# Patient Record
Sex: MALE | Race: BLACK OR AFRICAN AMERICAN
[De-identification: names, ages, dates, MRNs, and addresses within clinical notes are randomized per-mention and may not be internally consistent; named-entity substitution may affect disease eponyms.]

---

## 2019-06-21 ENCOUNTER — HOSPITAL ENCOUNTER (EMERGENCY)
Dept: HOSPITAL 5 - ED | Age: 74
Discharge: HOME | End: 2019-06-21
Payer: MEDICARE

## 2019-06-21 VITALS — SYSTOLIC BLOOD PRESSURE: 107 MMHG | DIASTOLIC BLOOD PRESSURE: 65 MMHG

## 2019-06-21 DIAGNOSIS — E11.9: ICD-10-CM

## 2019-06-21 DIAGNOSIS — I10: Primary | ICD-10-CM

## 2019-06-21 DIAGNOSIS — E78.00: ICD-10-CM

## 2019-06-21 DIAGNOSIS — Z79.899: ICD-10-CM

## 2019-06-21 DIAGNOSIS — G47.00: ICD-10-CM

## 2019-06-21 DIAGNOSIS — M19.90: ICD-10-CM

## 2019-06-21 LAB
ALBUMIN SERPL-MCNC: 3.8 G/DL (ref 3.9–5)
ALT SERPL-CCNC: 16 UNITS/L (ref 7–56)
BILIRUB UR QL STRIP: (no result)
BLOOD UR QL VISUAL: (no result)
BUN SERPL-MCNC: 13 MG/DL (ref 9–20)
BUN/CREAT SERPL: 22 %
CALCIUM SERPL-MCNC: 8.7 MG/DL (ref 8.4–10.2)
HCT VFR BLD CALC: 38 % (ref 35.5–45.6)
HEMOLYSIS INDEX: 8
HGB BLD-MCNC: 13.4 GM/DL (ref 11.8–15.2)
MCHC RBC AUTO-ENTMCNC: 35 % (ref 32–34)
MCV RBC AUTO: 95 FL (ref 84–94)
PH UR STRIP: 8 [PH] (ref 5–7)
PLATELET # BLD: 146 K/MM3 (ref 140–440)
PROT UR STRIP-MCNC: (no result) MG/DL
RBC # BLD AUTO: 4.02 M/MM3 (ref 3.65–5.03)
RBC #/AREA URNS HPF: < 1 /HPF (ref 0–6)
UROBILINOGEN UR-MCNC: < 2 MG/DL (ref ?–2)
WBC #/AREA URNS HPF: < 1 /HPF (ref 0–6)

## 2019-06-21 PROCEDURE — 82962 GLUCOSE BLOOD TEST: CPT

## 2019-06-21 PROCEDURE — 81001 URINALYSIS AUTO W/SCOPE: CPT

## 2019-06-21 PROCEDURE — 80053 COMPREHEN METABOLIC PANEL: CPT

## 2019-06-21 PROCEDURE — 85027 COMPLETE CBC AUTOMATED: CPT

## 2019-06-21 PROCEDURE — 36415 COLL VENOUS BLD VENIPUNCTURE: CPT

## 2019-06-21 NOTE — EMERGENCY DEPARTMENT REPORT
ED General Adult HPI





- General


Chief complaint: High BP


Stated complaint: HYPERTENSION


Time Seen by Provider: 06/21/19 10:05


Source: patient, EMS


Mode of arrival: Stretcher


Limitations: Language Barrier





- History of Present Illness


Initial comments: 





73-year-old male presents to ED with complaint of hypertension and insomnia.  

Patient states he ran out of his sleeping pills, and has been unable to sleep 

for the last 2 nights.  Patient states this has made him very tired.  Patient 

reports he took his blood pressure this morning and it was 160s/90s.  The 

patient denies headache, chest pain, shortness of breath, abdominal pain, 

vomiting, diarrhea or, fever.


-: days(s) (2)


Severity scale (0 -10): 0


Consistency: constant


Improves with: medication


Worsens with: none


Associated Symptoms: denies: chest pain, cough, diaphoresis, fever/chills, 

headaches, nausea/vomiting, shortness of breath





- Related Data


                                Home Medications











 Medication  Instructions  Recorded  Confirmed  Last Taken


 


Gabapentin [Neurontin] 300 mg PO DAILY 06/06/14 02/12/19 Unknown


 


ALPRAZolam [Xanax TAB] 0.5 mg PO BID PRN 12/21/18 02/12/19 Unknown


 


Cyclobenzaprine [Flexeril 10 MG 10 mg PO HS 12/21/18 02/12/19 Unknown





TAB]    


 


Metoprolol [Lopressor TAB] 25 mg PO DAILY 12/21/18 02/12/19 Unknown


 


Prazosin [Minipress] 1 mg PO HS 12/21/18 02/12/19 Unknown


 


Ranitidine HCl [Acid Reducer] 150 mg PO BID 12/21/18 02/12/19 Unknown








                                  Previous Rx's











 Medication  Instructions  Recorded  Last Taken  Type


 


metFORMIN XR [Glucophage XR] 500 mg PO QDDIAB #60 tablet 12/22/18 Unknown Rx











                                    Allergies











Allergy/AdvReac Type Severity Reaction Status Date / Time


 


No Known Allergies Allergy   Verified 06/21/19 09:19














ED Review of Systems


ROS: 


Stated complaint: HYPERTENSION


Other details as noted in HPI





Comment: All other systems reviewed and negative


Constitutional: denies: chills, fever


Respiratory: denies: cough, shortness of breath


Cardiovascular: denies: chest pain


Gastrointestinal: denies: abdominal pain, nausea, vomiting, diarrhea


Neurological: weakness (generalized).  denies: headache





ED Past Medical Hx





- Past Medical History


Previous Medical History?: Yes


Hx Hypertension: Yes


Hx Heart Attack/AMI: No


Hx Congestive Heart Failure: No


Hx Diabetes: Yes


Hx Deep Vein Thrombosis: No


Hx Liver Disease: No


Hx Arthritis: Yes


Additional medical history: high cholesterol, sinusitis,





- Surgical History


Past Surgical History?: Yes


Hx Coronary Stent: No


Hx Pacemaker: No


Hx Internal Defibrillator: No





- Social History


Smoking Status: Never Smoker





- Medications


Home Medications: 


                                Home Medications











 Medication  Instructions  Recorded  Confirmed  Last Taken  Type


 


Gabapentin [Neurontin] 300 mg PO DAILY 06/06/14 02/12/19 Unknown History


 


ALPRAZolam [Xanax TAB] 0.5 mg PO BID PRN 12/21/18 02/12/19 Unknown History


 


Cyclobenzaprine [Flexeril 10 MG 10 mg PO HS 12/21/18 02/12/19 Unknown History





TAB]     


 


Metoprolol [Lopressor TAB] 25 mg PO DAILY 12/21/18 02/12/19 Unknown History


 


Prazosin [Minipress] 1 mg PO HS 12/21/18 02/12/19 Unknown History


 


Ranitidine HCl [Acid Reducer] 150 mg PO BID 12/21/18 02/12/19 Unknown History


 


metFORMIN XR [Glucophage XR] 500 mg PO QDDIAB #60 tablet 12/22/18 02/12/19 

Unknown Rx














ED Physical Exam





- General


Limitations: Language Barrier


General appearance: alert, in no apparent distress





- Head


Head exam: Present: atraumatic, normocephalic





- Eye


Eye exam: Present: normal appearance





- ENT


ENT exam: Present: mucous membranes moist





- Neck


Neck exam: Present: normal inspection





- Respiratory


Respiratory exam: Present: normal lung sounds bilaterally.  Absent: respiratory 

distress





- Cardiovascular


Cardiovascular Exam: Present: regular rate, normal rhythm





- GI/Abdominal


GI/Abdominal exam: Present: soft.  Absent: distended, tenderness





- Extremities Exam


Extremities exam: Present: normal inspection





- Neurological Exam


Neurological exam: Present: alert, oriented X3





- Psychiatric


Psychiatric exam: Present: normal affect, normal mood





- Skin


Skin exam: Present: warm, dry, intact, normal color





ED Course


                                   Vital Signs











  06/21/19 06/21/19





  09:17 11:53


 


Temperature 97.8 F 


 


Pulse Rate 92 H 70


 


Respiratory 20 18





Rate  


 


Blood Pressure 140/81 107/65





[Right]  


 


O2 Sat by Pulse 97 97





Oximetry  














ED Medical Decision Making





- Lab Data


Result diagrams: 


                                 06/21/19 10:03





                                 06/21/19 10:03





- Medical Decision Making





The patient presented to the ED for hypertension and insomnia.  Patient not hyp

ertensive here in ED.  The patient reports that he has ran out of his medication

 to help him sleep at night.  Labs unremarkable except for mild hyponatremia.  

Patient is neurologically intact, no neuro deficits on exam.  Advised follow-up 

with his PCP for medication refill.  Will discharge at this time.


Critical care attestation.: 


If time is entered above; I have spent that time in minutes in the direct care 

of this critically ill patient, excluding procedure time.








ED Disposition


Clinical Impression: 


 Insomnia





Disposition: DC-01 TO HOME OR SELFCARE


Is pt being admited?: No


Condition: Stable


Instructions:  Insomnia (ED)


Referrals: 


PRIMARY CARE,MD [Referring] - 3-5 Days


Time of Disposition: 11:03

## 2019-10-20 ENCOUNTER — HOSPITAL ENCOUNTER (INPATIENT)
Dept: HOSPITAL 5 - ED | Age: 74
LOS: 2 days | Discharge: HOME | DRG: 64 | End: 2019-10-22
Attending: INTERNAL MEDICINE | Admitting: INTERNAL MEDICINE
Payer: MEDICARE

## 2019-10-20 DIAGNOSIS — Z79.899: ICD-10-CM

## 2019-10-20 DIAGNOSIS — M19.90: ICD-10-CM

## 2019-10-20 DIAGNOSIS — Z83.3: ICD-10-CM

## 2019-10-20 DIAGNOSIS — E11.9: ICD-10-CM

## 2019-10-20 DIAGNOSIS — E87.1: ICD-10-CM

## 2019-10-20 DIAGNOSIS — I10: ICD-10-CM

## 2019-10-20 DIAGNOSIS — F32.9: ICD-10-CM

## 2019-10-20 DIAGNOSIS — I63.9: Primary | ICD-10-CM

## 2019-10-20 DIAGNOSIS — E78.2: ICD-10-CM

## 2019-10-20 DIAGNOSIS — Z82.49: ICD-10-CM

## 2019-10-20 DIAGNOSIS — F43.10: ICD-10-CM

## 2019-10-20 DIAGNOSIS — Z71.3: ICD-10-CM

## 2019-10-20 DIAGNOSIS — E43: ICD-10-CM

## 2019-10-20 DIAGNOSIS — M94.0: ICD-10-CM

## 2019-10-20 LAB
ALBUMIN SERPL-MCNC: 4.5 G/DL (ref 3.9–5)
ALT SERPL-CCNC: 16 UNITS/L (ref 7–56)
BASOPHILS # (AUTO): 0 K/MM3 (ref 0–0.1)
BASOPHILS NFR BLD AUTO: 0.8 % (ref 0–1.8)
BILIRUB DIRECT SERPL-MCNC: < 0.2 MG/DL (ref 0–0.2)
BILIRUB UR QL STRIP: (no result)
BLOOD UR QL VISUAL: (no result)
BUN SERPL-MCNC: 13 MG/DL (ref 9–20)
BUN/CREAT SERPL: 33 %
CALCIUM SERPL-MCNC: 8.6 MG/DL (ref 8.4–10.2)
CREATININE,URINE: 17.8 MG/DL (ref 0.1–20)
EOSINOPHIL # BLD AUTO: 0 K/MM3 (ref 0–0.4)
EOSINOPHIL NFR BLD AUTO: 0.6 % (ref 0–4.3)
HCT VFR BLD CALC: 39.5 % (ref 35.5–45.6)
HEMOLYSIS INDEX: 7
HGB BLD-MCNC: 14 GM/DL (ref 11.8–15.2)
LYMPHOCYTES # BLD AUTO: 0.4 K/MM3 (ref 1.2–5.4)
LYMPHOCYTES NFR BLD AUTO: 13.3 % (ref 13.4–35)
MCHC RBC AUTO-ENTMCNC: 35 % (ref 32–34)
MCV RBC AUTO: 93 FL (ref 84–94)
MONOCYTES # (AUTO): 0.4 K/MM3 (ref 0–0.8)
MONOCYTES % (AUTO): 15.7 % (ref 0–7.3)
PH UR STRIP: 7 [PH] (ref 5–7)
PLATELET # BLD: 151 K/MM3 (ref 140–440)
PROT UR STRIP-MCNC: (no result) MG/DL
RBC # BLD AUTO: 4.23 M/MM3 (ref 3.65–5.03)
RBC #/AREA URNS HPF: < 1 /HPF (ref 0–6)
URATE SERPL-MCNC: 1.7 MG/DL (ref 3.5–7.6)
UROBILINOGEN UR-MCNC: < 2 MG/DL (ref ?–2)
WBC #/AREA URNS HPF: < 1 /HPF (ref 0–6)

## 2019-10-20 PROCEDURE — 71046 X-RAY EXAM CHEST 2 VIEWS: CPT

## 2019-10-20 PROCEDURE — 82550 ASSAY OF CK (CPK): CPT

## 2019-10-20 PROCEDURE — 84484 ASSAY OF TROPONIN QUANT: CPT

## 2019-10-20 PROCEDURE — 36415 COLL VENOUS BLD VENIPUNCTURE: CPT

## 2019-10-20 PROCEDURE — 83516 IMMUNOASSAY NONANTIBODY: CPT

## 2019-10-20 PROCEDURE — 80048 BASIC METABOLIC PNL TOTAL CA: CPT

## 2019-10-20 PROCEDURE — 93306 TTE W/DOPPLER COMPLETE: CPT

## 2019-10-20 PROCEDURE — 80076 HEPATIC FUNCTION PANEL: CPT

## 2019-10-20 PROCEDURE — 70450 CT HEAD/BRAIN W/O DYE: CPT

## 2019-10-20 PROCEDURE — 82607 VITAMIN B-12: CPT

## 2019-10-20 PROCEDURE — 93005 ELECTROCARDIOGRAM TRACING: CPT

## 2019-10-20 PROCEDURE — 93880 EXTRACRANIAL BILAT STUDY: CPT

## 2019-10-20 PROCEDURE — 93010 ELECTROCARDIOGRAM REPORT: CPT

## 2019-10-20 PROCEDURE — 83935 ASSAY OF URINE OSMOLALITY: CPT

## 2019-10-20 PROCEDURE — 70551 MRI BRAIN STEM W/O DYE: CPT

## 2019-10-20 PROCEDURE — 82962 GLUCOSE BLOOD TEST: CPT

## 2019-10-20 PROCEDURE — 81001 URINALYSIS AUTO W/SCOPE: CPT

## 2019-10-20 PROCEDURE — 96374 THER/PROPH/DIAG INJ IV PUSH: CPT

## 2019-10-20 PROCEDURE — 84443 ASSAY THYROID STIM HORMONE: CPT

## 2019-10-20 PROCEDURE — 84550 ASSAY OF BLOOD/URIC ACID: CPT

## 2019-10-20 PROCEDURE — 82570 ASSAY OF URINE CREATININE: CPT

## 2019-10-20 PROCEDURE — 70544 MR ANGIOGRAPHY HEAD W/O DYE: CPT

## 2019-10-20 PROCEDURE — 83735 ASSAY OF MAGNESIUM: CPT

## 2019-10-20 PROCEDURE — 85025 COMPLETE CBC W/AUTO DIFF WBC: CPT

## 2019-10-20 PROCEDURE — 87116 MYCOBACTERIA CULTURE: CPT

## 2019-10-20 PROCEDURE — 80061 LIPID PANEL: CPT

## 2019-10-20 RX ADMIN — Medication SCH ML: at 23:09

## 2019-10-20 RX ADMIN — FAMOTIDINE SCH MG: 20 TABLET ORAL at 23:08

## 2019-10-20 RX ADMIN — SODIUM CHLORIDE SCH MLS/HR: 0.9 INJECTION, SOLUTION INTRAVENOUS at 14:00

## 2019-10-20 RX ADMIN — PRAZOSIN HYDROCHLORIDE SCH MG: 1 CAPSULE ORAL at 23:08

## 2019-10-20 RX ADMIN — CYCLOBENZAPRINE SCH MG: 10 TABLET, FILM COATED ORAL at 23:08

## 2019-10-20 NOTE — CAT SCAN REPORT
CT head/brain wo con



INDICATION / CLINICAL INFORMATION:

73 years Male; headache.



TECHNIQUE: Routine CT head without contrast. All CT scans at this location are performed using CT dos
e reduction for ALARA by means of automated exposure control.



COMPARISON: 

6/6/2014



FINDINGS:



BRAIN / INTRACRANIAL CONTENTS: Subacute branch MCA infarct seen in the middle/inferior frontal gyral 
region on the right. No signs of hemorrhagic transformation. 



Otherwise, no acute hemorrhage, mass effect, midline shift, hydrocephalus, or acute, large territoria
l infarct.



There are minimal areas of decreased attenuation in the white matter of the cerebral hemispheres. The
se are nonspecific findings and may be related to microangiopathy (hypertension, diabetes, atheroscle
rosis), given the patient's age. It might be difficult to evaluate for small areas of ischemia withou
t diffusion imaging by MRI.



CRANIOCERVICAL JUNCTION: No significant abnormality.



ORBITS: No significant abnormality of visualized orbits.

SINUSES / MASTOIDS: There is minimal mucosal thickening in the ethmoids.



ADDITIONAL FINDINGS: No significant atherosclerotic disease appreciated. 



IMPRESSION:

1. Small region of subacute ischemia in the anterior MCA territory on the right as described above. N
o signs of hemorrhagic transformation. Follow-up with diffusion imaging by MRI, as clinically warrant
ed.



Signer Name: Curtis Fry MD, III 

Signed: 10/20/2019 12:12 PM

 Workstation Name: [id number]

## 2019-10-20 NOTE — EMERGENCY DEPARTMENT REPORT
ED General Adult HPI





- General


Chief complaint: Hyperglycemia


Stated complaint: CHEST PAIN/BLOOD SUGAR CHECK


Time Seen by Provider: 10/20/19 10:53


Source: patient, EMS, , RN notes reviewed, old records reviewed


Mode of arrival: Ambulatory


Limitations: Language Barrier





- History of Present Illness


Initial comments: 





Primary care Dr.: Dr. Bong Amor





 number: [id number]





This is a 73-year-old gentleman.  The patient reportedly has a history of 

arthritis, diabetes, hypertension and high cholesterol.  The patient presents to

the ER today with complaint of sensation of elevated blood sugar, and chest pain

and headache.  Apparently, his glucose was 172.  The chest pain is left-sided.  

It's been present intermittently since 5:00 in the morning.  It does not radiate

anywhere.  It does not have exacerbating or relieving factors.  The patient 

denies DVT, pulmonary embolism risk factors.





The headache is frontal.  The headache has been present since 5:00 in the 

morning.  The headache is intermittent.  The headache is not described as sudden

or thunderclap in nature.  The patient denies neck pain, neck stiffness, chest 

pain, abdominal pain, shortness of breath and urinary symptoms.  The patient 

denies alcohol consumption.





The patient denies loss of vision, visual change, and jaw claudication.


-: hour(s)


Location: head, chest


Severity scale (0 -10): 6


Quality: other


Consistency: other


Improves with: other


Worsens with: other





- Related Data


                                Home Medications











 Medication  Instructions  Recorded  Confirmed  Last Taken


 


Gabapentin [Neurontin] 300 mg PO DAILY 14 Unknown


 


ALPRAZolam [Xanax TAB] 0.5 mg PO BID PRN 18 Unknown


 


Cyclobenzaprine [Flexeril 10 MG 10 mg PO HS 18 Unknown





TAB]    


 


Metoprolol [Lopressor TAB] 25 mg PO DAILY 18 Unknown


 


Prazosin [Minipress] 1 mg PO HS 18 Unknown


 


Ranitidine HCl [Acid Reducer] 150 mg PO BID 18 Unknown








                                  Previous Rx's











 Medication  Instructions  Recorded  Last Taken  Type


 


metFORMIN XR [Glucophage XR] 500 mg PO QDDIAB #60 tablet 18 Unknown Rx


 


Nitrofurantoin Mono/M-Cryst 100 mg PO Q12HR #14 capsule 19 Unknown Rx





[Macrobid CAP]    


 


Famotidine [Pepcid] 40 mg PO QHS #30 tablet 19 Unknown Rx


 


Ondansetron [Zofran Odt] 4 mg PO Q8HR #20 tab.rapdis 19 Unknown Rx


 


Ondansetron [Zofran Odt] 4 mg PO Q8HR PRN #14 tab.rapdis 19 Unknown Rx


 


levoFLOXacin [Levaquin TAB] 500 mg PO QDAY #7 tablet 19 Unknown Rx


 


traMADol [Ultram] 50 mg PO Q6HR PRN #15 tablet 19 Unknown Rx











                                    Allergies











Allergy/AdvReac Type Severity Reaction Status Date / Time


 


No Known Allergies Allergy   Verified 10/20/19 09:29














ED Review of Systems


ROS: 


Stated complaint: CHEST PAIN/BLOOD SUGAR CHECK


Other details as noted in HPI





Constitutional: denies: fever


Eyes: denies: vision change


ENT: denies: dental pain, congestion


Respiratory: denies: shortness of breath, SOB with exertion, SOB at rest, 

wheezing


Cardiovascular: chest pain


Gastrointestinal: denies: abdominal pain


Genitourinary: denies: dysuria


Skin: denies: lesions


Neurological: headache.  denies: weakness


Hematological/Lymphatic: denies: easy bleeding





ED Past Medical Hx





- Past Medical History


Hx Hypertension: Yes


Hx Heart Attack/AMI: No


Hx Congestive Heart Failure: No


Hx Diabetes: Yes


Hx Deep Vein Thrombosis: No


Hx Liver Disease: No


Hx Arthritis: Yes


Additional medical history: high cholesterol, sinusitis,





- Surgical History


Hx Coronary Stent: No


Hx Pacemaker: No


Hx Internal Defibrillator: No





- Social History


Smoking Status: Unknown if ever smoked





- Medications


Home Medications: 


                                Home Medications











 Medication  Instructions  Recorded  Confirmed  Last Taken  Type


 


Gabapentin [Neurontin] 300 mg PO DAILY 14 Unknown History


 


ALPRAZolam [Xanax TAB] 0.5 mg PO BID PRN 18 Unknown History


 


Cyclobenzaprine [Flexeril 10 MG 10 mg PO HS 18 Unknown History





TAB]     


 


Metoprolol [Lopressor TAB] 25 mg PO DAILY 18 Unknown History


 


Prazosin [Minipress] 1 mg PO HS 18 Unknown History


 


Ranitidine HCl [Acid Reducer] 150 mg PO BID 18 Unknown History


 


metFORMIN XR [Glucophage XR] 500 mg PO QDDIAB #60 tablet 18 

Unknown Rx


 


Nitrofurantoin Mono/M-Cryst 100 mg PO Q12HR #14 capsule 19  Unknown Rx





[Macrobid CAP]     


 


Famotidine [Pepcid] 40 mg PO QHS #30 tablet 19  Unknown Rx


 


Ondansetron [Zofran Odt] 4 mg PO Q8HR #20 tab.rapdis 19  Unknown Rx


 


Ondansetron [Zofran Odt] 4 mg PO Q8HR PRN #14 tab.rapdis 19  Unknown Rx


 


levoFLOXacin [Levaquin TAB] 500 mg PO QDAY #7 tablet 19  Unknown Rx


 


traMADol [Ultram] 50 mg PO Q6HR PRN #15 tablet 19  Unknown Rx














ED Physical Exam





- General


Limitations: Language Barrier


General appearance: alert, in no apparent distress





- Head


Head exam: Present: atraumatic, normocephalic





- Eye


Eye exam: Present: normal appearance, EOMI.  Absent: nystagmus





- ENT


ENT exam: Present: normal exam, normal orophraynx, mucous membranes moist, 

normal external ear exam





- Neck


Neck exam: Present: normal inspection, full ROM.  Absent: tenderness, 

meningismus





- Respiratory


Respiratory exam: Present: normal lung sounds bilaterally.  Absent: respiratory 

distress





- Cardiovascular


Cardiovascular Exam: Present: regular rate, normal rhythm, normal heart sounds. 

 Absent: bradycardia, tachycardia, irregular rhythm, systolic murmur, diastolic 

murmur, rubs, gallop





- GI/Abdominal


GI/Abdominal exam: Present: soft.  Absent: distended, tenderness, guarding, 

rebound, rigid, pulsatile mass





- Rectal


Rectal exam: Present: deferred





- Extremities Exam


Extremities exam: Present: normal inspection, full ROM, other (2+ pulses noted 

in the bilateral upper, lower extremities.  There is no long bone tenderness.  

Musculoskeletal compartments are soft.  The pelvis is stable.).  Absent: pedal 

edema, calf tenderness





- Back Exam


Back exam: Present: normal inspection, full ROM.  Absent: tenderness, CVA 

tenderness (R), CVA tenderness (L), paraspinal tenderness, vertebral tenderness





- Neurological Exam


Neurological exam: Present: alert, other (visual acuity intact to finger c

ounting and color perception at a close distance.  There is no facial droop.  

The tongue is midline.  The extraocular movements are intact bilaterally.  There

 is 5 out of 5 strength bilateral upper, lower extremities.  Sensation is intact

 to light touch bilateral upper, lower extremities.)





- Psychiatric


Psychiatric exam: Present: normal affect, normal mood





- Skin


Skin exam: Present: warm, dry, intact, normal color.  Absent: rash





ED Course


                                   Vital Signs











  10/20/19 10/20/19 10/20/19





  09:29 11:07 11:30


 


Temperature 98.1 F  


 


Pulse Rate 73  


 


Respiratory 18  





Rate   


 


Blood Pressure 143/81 143/78 154/79


 


O2 Sat by Pulse 97 98 98





Oximetry   














  10/20/19 10/20/19





  12:00 12:30


 


Temperature  


 


Pulse Rate  


 


Respiratory  





Rate  


 


Blood Pressure 128/76 136/82


 


O2 Sat by Pulse 99 98





Oximetry  














ED Medical Decision Making





- Lab Data


Result diagrams: 


                                 10/20/19 10:18





                                 10/20/19 10:18








                                   Vital Signs











  10/20/19 10/20/19 10/20/19





  09:29 11:07 11:30


 


Temperature 98.1 F  


 


Pulse Rate 73  


 


Respiratory 18  





Rate   


 


Blood Pressure 143/81 143/78 154/79


 


O2 Sat by Pulse 97 98 98





Oximetry   














  10/20/19 10/20/19





  12:00 12:30


 


Temperature  


 


Pulse Rate  


 


Respiratory  





Rate  


 


Blood Pressure 128/76 136/82


 


O2 Sat by Pulse 99 98





Oximetry  











                                   Lab Results











  10/20/19 10/20/19 10/20/19 Range/Units





  09:43 10:18 10:18 


 


WBC   2.7 L   (4.5-11.0)  K/mm3


 


RBC   4.23   (3.65-5.03)  M/mm3


 


Hgb   14.0   (11.8-15.2)  gm/dl


 


Hct   39.5   (35.5-45.6)  %


 


MCV   93   (84-94)  fl


 


MCH   33 H   (28-32)  pg


 


MCHC   35 H   (32-34)  %


 


RDW   13.6   (13.2-15.2)  %


 


Plt Count   151   (140-440)  K/mm3


 


Lymph % (Auto)   13.3 L   (13.4-35.0)  %


 


Mono % (Auto)   15.7 H   (0.0-7.3)  %


 


Eos % (Auto)   0.6   (0.0-4.3)  %


 


Baso % (Auto)   0.8   (0.0-1.8)  %


 


Lymph #   0.4 L   (1.2-5.4)  K/mm3


 


Mono #   0.4   (0.0-0.8)  K/mm3


 


Eos #   0.0   (0.0-0.4)  K/mm3


 


Baso #   0.0   (0.0-0.1)  K/mm3


 


Seg Neutrophils %   69.6   (40.0-70.0)  %


 


Seg Neutrophils #   1.9   (1.8-7.7)  K/mm3


 


Sodium    118 L*  (137-145)  mmol/L


 


Potassium    4.4  (3.6-5.0)  mmol/L


 


Chloride    83.6 L  ()  mmol/L


 


Carbon Dioxide    22  (22-30)  mmol/L


 


Anion Gap    17  mmol/L


 


BUN    13  (9-20)  mg/dL


 


Creatinine    0.4 L  (0.8-1.5)  mg/dL


 


Estimated GFR    > 60  ml/min


 


BUN/Creatinine Ratio    33  %


 


Glucose    127 H  ()  mg/dL


 


POC Glucose  143 H    ()  


 


Uric Acid     (3.5-7.6)  mg/dL


 


Calcium    8.6  (8.4-10.2)  mg/dL


 


Magnesium     (1.7-2.3)  mg/dL


 


Total Bilirubin     (0.1-1.2)  mg/dL


 


Direct Bilirubin     (0-0.2)  mg/dL


 


AST     (5-40)  units/L


 


ALT     (7-56)  units/L


 


Alkaline Phosphatase     ()  units/L


 


Total Creatine Kinase     ()  units/L


 


Troponin T    < 0.010  (0.00-0.029)  ng/mL


 


Total Protein     (6.3-8.2)  g/dL


 


Albumin     (3.9-5)  g/dL


 


Albumin/Globulin Ratio     %


 


TSH     (0.270-4.200)  mlU/mL














  10/20/19 10/20/19 Range/Units





  11:11 11:11 


 


WBC    (4.5-11.0)  K/mm3


 


RBC    (3.65-5.03)  M/mm3


 


Hgb    (11.8-15.2)  gm/dl


 


Hct    (35.5-45.6)  %


 


MCV    (84-94)  fl


 


MCH    (28-32)  pg


 


MCHC    (32-34)  %


 


RDW    (13.2-15.2)  %


 


Plt Count    (140-440)  K/mm3


 


Lymph % (Auto)    (13.4-35.0)  %


 


Mono % (Auto)    (0.0-7.3)  %


 


Eos % (Auto)    (0.0-4.3)  %


 


Baso % (Auto)    (0.0-1.8)  %


 


Lymph #    (1.2-5.4)  K/mm3


 


Mono #    (0.0-0.8)  K/mm3


 


Eos #    (0.0-0.4)  K/mm3


 


Baso #    (0.0-0.1)  K/mm3


 


Seg Neutrophils %    (40.0-70.0)  %


 


Seg Neutrophils #    (1.8-7.7)  K/mm3


 


Sodium    (137-145)  mmol/L


 


Potassium    (3.6-5.0)  mmol/L


 


Chloride    ()  mmol/L


 


Carbon Dioxide    (22-30)  mmol/L


 


Anion Gap    mmol/L


 


BUN    (9-20)  mg/dL


 


Creatinine    (0.8-1.5)  mg/dL


 


Estimated GFR    ml/min


 


BUN/Creatinine Ratio    %


 


Glucose    ()  mg/dL


 


POC Glucose    ()  


 


Uric Acid  1.7 L   (3.5-7.6)  mg/dL


 


Calcium    (8.4-10.2)  mg/dL


 


Magnesium  2.00   (1.7-2.3)  mg/dL


 


Total Bilirubin  0.30   (0.1-1.2)  mg/dL


 


Direct Bilirubin  < 0.2   (0-0.2)  mg/dL


 


AST  23   (5-40)  units/L


 


ALT  16   (7-56)  units/L


 


Alkaline Phosphatase  68   ()  units/L


 


Total Creatine Kinase  249 H   ()  units/L


 


Troponin T    (0.00-0.029)  ng/mL


 


Total Protein  7.0   (6.3-8.2)  g/dL


 


Albumin  4.5   (3.9-5)  g/dL


 


Albumin/Globulin Ratio  1.8   %


 


TSH   1.970  (0.270-4.200)  mlU/mL














- EKG Data


-: EKG Interpreted by Me


EKG shows normal: sinus rhythm


Rate: normal





- EKG Data





10/20/19 13:35


The EKG shows a sinus rhythm, 79 bpm, normal axis, QTC is 398 ms, there is left 

ventricular hypertrophy, there is motion artifact.  The EKG is abnormal, it is 

not consistent with ST elevation myocardial infarction.  The EKG is unchanged 

from prior from 2019.





- Radiology Data


Radiology results: report reviewed, image reviewed





Differential diagnosis, including but not limited to:








X-ray the chest is negative for acute disease.  Noncontrast CT scan of the brain

 suggestive subacute MCA infarct.

















Referring Physician: TEZ STONE 


 


Patient Name: KIM CHIU 


 


Patient ID: [id number] 


 


YOB: 1945 


 


Sex: Male 


 


Accession: [id number] 


 


Report Date: 2019-10-20 


 


Report Status: Finalized 


 


Findings


[facility] [address] [place] [address] Cat

 Scan Report Signed Patient: KIM CHIU MR#: [id number] : 1945 Acct:[id number]

[id number] Age/Sex: 73 / M ADM Date: 10/20/19 Loc: ED Attending Dr: Ordering 

Physician: TEZ STONE MD Date of Service: 10/20/19 Procedure(s): CT 

head/brain wo con Accession Number(s): [id number] cc: ETZ STONE MD CT 

head/brain wo con INDICATION / CLINICAL INFORMATION: 73 years Male; headache. 

TECHNIQUE: Routine CT head without contrast. All CT scans at this location are 

performed using CT dose reduction for ALARA by means of automated exposure 

control. COMPARISON: 2014 FINDINGS: BRAIN / INTRACRANIAL CONTENTS: Subacute 

branch MCA infarct seen in the middle/inferior frontal gyral region on the 

right. No signs of hemorrhagic transformation. Otherwise, no acute hemorrhage, 

mass effect, midline shift, hydrocephalus, or acute, large territorial infarct. 

There are minimal areas of decreased attenuation in the white matter of the 

cerebral hemispheres. These are nonspecific findings and may be related to 

microangiopathy (hypertension, diabetes, atherosclerosis), given the patient's 

age. It might be difficult to evaluate for small areas of ischemia without 

diffusion imaging by MRI. CRANIOCERVICAL JUNCTION: No significant abnormality. 

ORBITS: No significant abnormality of visualized orbits. SINUSES / MASTOIDS: 

There is minimal mucosal thickening in the ethmoids. ADDITIONAL FINDINGS: No 

significant atherosclerotic disease appreciated. IMPRESSION: 1. Small region of 

subacute ischemia in the anterior MCA territory on the right as described above.

 No signs of hemorrhagic transformation. Follow-up with diffusion imaging by 

MRI, as clinically w arranted. Signer Name: Curtis Fry MD, III Signed: 

10/20/2019 12:12 PM Workstation Name: [email or web address] Transcribed By: HR Dictated 

By: Curtis Fry MD Electronically Authenticated By: Curtis Fry MD Signed Date/Time: 10/20/19 1212 DD/DT: 10/20/19 1206 TD/TT:   














- Medical Decision Making





Differential diagnosis, including but not limited to: Dehydration, pneumonia, 

urinary tract infection, electrolyte, migraine headache, tension headache, 

cluster headache, subacute stroke, acute coronary syndrome, pneumonia








Assessment and plan: 73-year-old gentleman with a primary complaint of 

hyperglycemia, headache, and chest pain.  The patient is afebrile with 

reassuring vital signs.  He is not tachycardic, tachypneic, hypoxic, he is low 

risk by well's criteria.  Troponin negative, EKG unchanged from prior, had a 

nuclear stress test earlier on this year negative for acute disease.  GCS of 15,

 NIH score of 0.  Exam not consistent or suggestive of large vessel occlusion at

 this time.  CT scan of the brain suggests a subacute MCA infarct.  Patient not 

a TPA candidate given last known well time is not known, given NIH score of 0.  

Emergent CT angiogram not indicated given exam at this time.  Aspirin will be 

ordered.  He is found to be hyponatremic.  He'll be started on gentle IV 

hydration.  X-ray the chest is unremarkable.  Hospital physician, Dr. Marcelino to 

admit.


Critical care attestation.: 


If time is entered above; I have spent that time in minutes in the direct care 

of this critically ill patient, excluding procedure time.








ED Disposition


Clinical Impression: 


 Hyponatremia, Headache





Chest pain


Qualifiers:


 Chest pain type: unspecified Qualified Code(s): R07.9 - Chest pain, unspecified





Disposition:  OP ADMIT IP TO THIS HOSP


Is pt being admited?: Yes


Does the pt Need Aspirin: Yes


Condition: Good

## 2019-10-20 NOTE — XRAY REPORT
CHEST 2 VIEWS 



INDICATION / CLINICAL INFORMATION:

Chest Pain. 



COMPARISON: 

9/17/2019



FINDINGS:



SUPPORT DEVICES: None.

HEART / MEDIASTINUM: No significant abnormality. 

LUNGS / PLEURA: No significant pulmonary or pleural abnormality. No pneumothorax. 



ADDITIONAL FINDINGS: No significant additional findings.



IMPRESSION:

1. No acute findings. 



Signer Name: Italo Moya MD 

Signed: 10/20/2019 12:14 PM

 Workstation Name: [id number]

## 2019-10-20 NOTE — HISTORY AND PHYSICAL REPORT
History of Present Illness


Chief complaint: 





My head hurts, and I feel weak


History of present illness: 


72 YO Male with DM, HTN, OA, HLD, Depression, Severe Malnutrition, PTSD presents

to ED for evaluation. Pt was in his normal state of health around bedtime at 

2130 hours. Pt was awakened from sleep around 0500 hours feeling weak, as well 

as experienced a severe headache and chest discomfort. EMS notified, and upon a

rrival the patient was found to be in distress. Pt seen and evaluated in ED and 

underwent CT head which revealed evidence of Subacute CVA. Pt also found to have

hyponatremia. Pt initiated on CVA protocol, and admitted to [facility] unit with remote

telemetry. Pt is outside therapeutic window for TPA. Neurology consulted. Pt 

initiated on anitplatelet therapy. Pt denies fever, chills, CP, palpitations, 

NVD, Productive cough, skin rash, or recent ill contacts. Prior admission on 

2/11/19 reviewed. All listed medication reconciled at time of admission. 








Past History


Past Medical History: other (see hpi)


Past Surgical History: No surgical history, Other (reviewed)


Social history: .  denies: smoking, alcohol abuse, prescription drug 

abuse


Family history: diabetes, hypertension





Medications and Allergies


                                    Allergies











Allergy/AdvReac Type Severity Reaction Status Date / Time


 


No Known Allergies Allergy   Verified 10/20/19 09:29











                                Home Medications











 Medication  Instructions  Recorded  Confirmed  Last Taken  Type


 


Gabapentin [Neurontin] 300 mg PO DAILY 06/06/14 02/12/19 Unknown History


 


ALPRAZolam [Xanax TAB] 0.5 mg PO BID PRN 12/21/18 02/12/19 Unknown History


 


Cyclobenzaprine [Flexeril 10 MG 10 mg PO HS 12/21/18 02/12/19 Unknown History





TAB]     


 


Metoprolol [Lopressor TAB] 25 mg PO DAILY 12/21/18 02/12/19 Unknown History


 


Prazosin [Minipress] 1 mg PO HS 12/21/18 02/12/19 Unknown History


 


Ranitidine HCl [Acid Reducer] 150 mg PO BID 12/21/18 02/12/19 Unknown History


 


metFORMIN XR [Glucophage XR] 500 mg PO QDDIAB #60 tablet 12/22/18 02/12/19 

Unknown Rx


 


Nitrofurantoin Mono/M-Cryst 100 mg PO Q12HR #14 capsule 09/17/19  Unknown Rx





[Macrobid CAP]     


 


Famotidine [Pepcid] 40 mg PO QHS #30 tablet 09/18/19  Unknown Rx


 


Ondansetron [Zofran Odt] 4 mg PO Q8HR #20 tab.rapdis 09/18/19  Unknown Rx


 


Ondansetron [Zofran Odt] 4 mg PO Q8HR PRN #14 tab.rapdis 09/18/19  Unknown Rx


 


levoFLOXacin [Levaquin TAB] 500 mg PO QDAY #7 tablet 09/18/19  Unknown Rx


 


traMADol [Ultram] 50 mg PO Q6HR PRN #15 tablet 09/18/19  Unknown Rx














Review of Systems


Constitutional: no weight loss, no weight gain, no fever, no chills


Ears, nose, mouth and throat: no ear pain, no ear discharge, no tinnitis, no 

decreased hearing, no nose pain, no nasal congestion


Cardiovascular: no chest pain, no orthopnea, no palpitations, no rapid/irregular

 heart beat, no edema, no syncope


Respiratory: no cough, no cough with sputum, no excessive sputum, no hemoptysis,

 no shortness of breath


Gastrointestinal: no nausea, no vomiting, no diarrhea, no constipation


Genitourinary Male: no hematuria, no flank pain, no discharge, no urinary 

hesitancy, no nocturia


Rectal: no pain, no incontinence, no bleeding


Musculoskeletal: no neck stiffness, no neck pain, no shooting arm pain, no arm 

numbness/tingling, no low back pain


Integumentary: no rash, no pruritis, no redness, no sores, no wounds


Neurological: weakness, headaches, no transient paralysis, no paralysis, no 

tingling, no seizures, no syncope


Psychiatric: no anxiety, no memory loss, no change in sleep habits, no sleep 

disturbances, no hypersomnia, no change in appetite


Endocrine: no cold intolerance, no heat intolerance, no polyphagia, no excessive

 thirst, no polyuria, no nocturia, no excessive sweating


Hematologic/Lymphatic: no easy bruising, no easy bleeding, no lymphadenopathy, 

no lymphedema


Allergic/Immunologic: no urticaria, no allergic rhinitis, no wheezing, no 

anaphylaxis





Exam





- Constitutional


Vitals: 


                                        











Temp Pulse Resp BP Pulse Ox


 


 98.1 F   73   18   143/81   97 


 


 10/20/19 09:29  10/20/19 09:29  10/20/19 09:29  10/20/19 09:29  10/20/19 09:29











General appearance: Present: mild distress





- EENT


Eyes: Present: PERRL


ENT: hearing intact, clear oral mucosa





- Neck


Neck: Present: supple, normal ROM





- Respiratory


Respiratory effort: normal


Respiratory: bilateral: CTA





- Cardiovascular


Heart Sounds: Present: S1 & S2.  Absent: rub, click





- Extremities


Extremities: pulses symmetrical, No edema


Peripheral Pulses: within normal limits





- Abdominal


General gastrointestinal: Present: soft, non-tender, non-distended, normal bowel

 sounds


Male genitourinary: Present: normal





- Integumentary


Integumentary: Present: clear, warm, dry





- Musculoskeletal


Musculoskeletal: gait normal, strength equal bilaterally





- Psychiatric


Psychiatric: appropriate mood/affect, intact judgment & insight





- Neurologic


Neurologic: CNII-XII intact, moves all extremities





Results





- Labs


CBC & Chem 7: 


                                 10/20/19 10:18





                                 10/20/19 10:18


Labs: 


                              Abnormal lab results











  10/20/19 10/20/19 10/20/19 Range/Units





  09:43 10:18 10:18 


 


WBC   2.7 L   (4.5-11.0)  K/mm3


 


MCH   33 H   (28-32)  pg


 


MCHC   35 H   (32-34)  %


 


Lymph % (Auto)   13.3 L   (13.4-35.0)  %


 


Mono % (Auto)   15.7 H   (0.0-7.3)  %


 


Lymph #   0.4 L   (1.2-5.4)  K/mm3


 


Sodium    118 L*  (137-145)  mmol/L


 


Chloride    83.6 L  ()  mmol/L


 


Creatinine    0.4 L  (0.8-1.5)  mg/dL


 


Glucose    127 H  ()  mg/dL


 


POC Glucose  143 H    ()  


 


Uric Acid     (3.5-7.6)  mg/dL


 


Total Creatine Kinase     ()  units/L














  10/20/19 Range/Units





  11:11 


 


WBC   (4.5-11.0)  K/mm3


 


MCH   (28-32)  pg


 


MCHC   (32-34)  %


 


Lymph % (Auto)   (13.4-35.0)  %


 


Mono % (Auto)   (0.0-7.3)  %


 


Lymph #   (1.2-5.4)  K/mm3


 


Sodium   (137-145)  mmol/L


 


Chloride   ()  mmol/L


 


Creatinine   (0.8-1.5)  mg/dL


 


Glucose   ()  mg/dL


 


POC Glucose   ()  


 


Uric Acid  1.7 L  (3.5-7.6)  mg/dL


 


Total Creatine Kinase  249 H  ()  units/L














Assessment and Plan





- Patient Problems


(1) CVA (cerebral vascular accident)


Current Visit: Yes   Status: Acute   


Qualifiers: 


   Precerebral and cerebral artery: middle cerebral artery   Laterality of 

affected vessel: right 


Plan to address problem: 


Subacute R MCA Infarct: CTA protocol: CT head, neuro check, antiplatelet 

therapy, PT/OT/Speech therapy, Echo, carotid doppler, seizure precautions, 

swallow screening, Neurology consulted, further imaging as per neurology team. 








(2) Headache


Current Visit: Yes   Status: Acute   


Qualifiers: 


   Headache chronicity pattern: acute headache 


Plan to address problem: 


CT head, neuor check, pain control








(3) Hyponatremia


Current Visit: Yes   Status: Acute   


Plan to address problem: 


IVF resuscitation therapy, repeat bmp, 








(4) HTN (hypertension)


Current Visit: Yes   Status: Acute   


Qualifiers: 


   Hypertension type: essential hypertension   Qualified Code(s): I10 - 

Essential (primary) hypertension   


Plan to address problem: 


monitor bp q shift, permissive htn overnight. 








(5) HLD (hyperlipidemia)


Current Visit: Yes   Status: Acute   


Qualifiers: 


   Hyperlipidemia type: mixed hyperlipidemia   Qualified Code(s): E78.2 - Mixed 

hyperlipidemia   


Plan to address problem: 


Lipid panel, statin therapy. 








(6) Diabetes


Current Visit: Yes   Status: Acute   


Plan to address problem: 


ADA diet, insulin sliding scale, accucheck, hypoglycemia protocol








(7) Osteoarthritis


Current Visit: Yes   Status: Acute   


Qualifiers: 


   Laterality: unspecified laterality 


Plan to address problem: 


pain control, nsaid therapy, 








(8) Severe malnutrition


Current Visit: Yes   Status: Acute   


Plan to address problem: 


Encourage increased protein in take, dietary supplementation








(9) DVT prophylaxis


Current Visit: Yes   Status: Acute   


Plan to address problem: 


SCD to BLE while in bed, supportive care

## 2019-10-21 LAB
BUN SERPL-MCNC: 8 MG/DL (ref 9–20)
BUN/CREAT SERPL: 20 %
CALCIUM SERPL-MCNC: 7.8 MG/DL (ref 8.4–10.2)
HDLC SERPL-MCNC: 57 MG/DL (ref 40–59)
HEMOLYSIS INDEX: 8

## 2019-10-21 RX ADMIN — SODIUM CHLORIDE SCH MLS/HR: 0.9 INJECTION, SOLUTION INTRAVENOUS at 03:03

## 2019-10-21 RX ADMIN — Medication SCH ML: at 21:14

## 2019-10-21 RX ADMIN — ASPIRIN SCH MG: 325 TABLET ORAL at 11:29

## 2019-10-21 RX ADMIN — FAMOTIDINE SCH MG: 20 TABLET ORAL at 21:14

## 2019-10-21 RX ADMIN — PRAZOSIN HYDROCHLORIDE SCH MG: 1 CAPSULE ORAL at 21:14

## 2019-10-21 RX ADMIN — CYCLOBENZAPRINE SCH MG: 10 TABLET, FILM COATED ORAL at 21:13

## 2019-10-21 RX ADMIN — Medication SCH ML: at 11:29

## 2019-10-21 RX ADMIN — GABAPENTIN SCH MG: 300 CAPSULE ORAL at 11:28

## 2019-10-21 NOTE — CONSULTATION
History of Present Illness


Consult date: 10/21/19


Reason for Consult: Stroke


Chief complaint: 


Left sided weakness





History of present illness: 


Patient is a 72 y/o man w/ a h/o HTN, HLD, DM, OA, depression, PTSD, 

malnutrition. He was last known normal at 9pm on 10/19/19. Patient awoke at 

about 5am on 10/20/19, and noted that he was weak on the LUE/LLE, and also was 

experiencing chest pain. He then came to [facility] for further evaluation. CT head on

admission showed subacute Rt. MCA territory infarct. He states that his Lt. 

sided weakness persists. Patient was also found to have hyponatremia on 

admission. 








Past History


Past Medical History: other (see hpi)


Past Surgical History: No surgical history, Other (reviewed)


Social history: , Lives alone.  denies: smoking, alcohol abuse, 

prescription drug abuse


Family history: diabetes, hypertension





Medications and Allergies


                                    Allergies











Allergy/AdvReac Type Severity Reaction Status Date / Time


 


No Known Allergies Allergy   Verified 10/20/19 09:29











                                Home Medications











 Medication  Instructions  Recorded  Confirmed  Last Taken  Type


 


Gabapentin [Neurontin] 300 mg PO DAILY 06/06/14 10/21/19 Unknown History


 


ALPRAZolam [Xanax TAB] 0.5 mg PO BID PRN 12/21/18 10/21/19 Unknown History


 


Cyclobenzaprine [Flexeril 10 MG 10 mg PO HS 12/21/18 10/21/19 10/18/19 History





TAB]    10 mg 


 


Metoprolol [Lopressor TAB] 25 mg PO DAILY 12/21/18 10/21/19 Unknown History


 


Prazosin [Minipress] 1 mg PO HS 12/21/18 10/21/19 Unknown History


 


Ranitidine HCl [Acid Reducer] 150 mg PO BID 12/21/18 10/21/19 Unknown History


 


metFORMIN XR [Glucophage XR] 500 mg PO QDDIAB #60 tablet 12/22/18 10/21/19 

Unknown Rx


 


Nitrofurantoin Mono/M-Cryst 100 mg PO Q12HR #14 capsule 09/17/19 10/21/19 

Unknown Rx





[Macrobid CAP]     


 


Famotidine [Pepcid] 40 mg PO QHS #30 tablet 09/18/19 10/21/19 Unknown Rx


 


Ondansetron [Zofran Odt] 4 mg PO Q8HR #20 tab.rapdis 09/18/19 10/21/19 Unknown 

Rx


 


Ondansetron [Zofran Odt] 4 mg PO Q8HR PRN #14 tab.rapdis 09/18/19 10/21/19 

Unknown Rx


 


levoFLOXacin [Levaquin TAB] 500 mg PO QDAY #7 tablet 09/18/19 10/21/19 Unknown 

Rx


 


traMADol [Ultram] 50 mg PO Q6HR PRN #15 tablet 09/18/19 10/21/19 Unknown Rx











Active Meds: 


Active Medications





Acetaminophen (Tylenol)  650 mg PO Q4H PRN


   PRN Reason: Pain, Mild (1-3)


Albuterol (Proventil)  2.5 mg IH Q3H PRN


   PRN Reason: Shortness Of Breath


Alprazolam (Xanax)  0.5 mg PO BID PRN


   PRN Reason: Agitation


Aspirin (Aspirin)  325 mg PO QDAY [facility]


   Last Admin: 10/21/19 11:29 Dose:  325 mg


   Documented by: 


Atorvastatin Calcium (Lipitor)  40 mg PO QHS [facility]


   Last Admin: 10/20/19 23:08 Dose:  40 mg


   Documented by: 


Bisacodyl (Dulcolax)  10 mg CO QDAY PRN


   PRN Reason: Constipation


Cyclobenzaprine HCl (Flexeril)  10 mg PO HS [facility]


   Last Admin: 10/20/19 23:08 Dose:  10 mg


   Documented by: 


Dextrose (D50w (25gm) Syringe)  50 ml IV Q30MIN PRN


   PRN Reason: Hypoglycemia


Famotidine (Pepcid)  40 mg PO QHS [facility]


   Last Admin: 10/20/19 23:08 Dose:  40 mg


   Documented by: 


Gabapentin (Neurontin)  300 mg PO DAILY [facility]


   Last Admin: 10/21/19 11:28 Dose:  300 mg


   Documented by: 


Sodium Chloride (Nacl 0.9% 1000 Ml)  1,000 mls @ 75 mls/hr IV AS DIRECT [facility]


   Last Admin: 10/21/19 03:03 Dose:  75 mls/hr


   Documented by: 


Insulin Human Lispro (Humalog)  0 unit SUB-Q ACHS [facility]; Protocol


   Last Admin: 10/21/19 16:48 Dose:  Not Given


   Documented by: 


Magnesium Hydroxide (Milk Of Magnesia)  30 ml PO Q4H PRN


   PRN Reason: Constipation


Metoclopramide HCl (Reglan)  10 mg PO Q6H PRN


   PRN Reason: Nausea And Vomiting


Ondansetron HCl (Zofran)  4 mg IV Q8H PRN


   PRN Reason: Nausea And Vomiting


Ondansetron HCl (Zofran Odt)  4 mg PO Q8HR PRN


   PRN Reason: Nausea And Vomiting


Prazosin HCl (Minipress)  1 mg PO HS [facility]


   Last Admin: 10/20/19 23:08 Dose:  1 mg


   Documented by: 


Promethazine HCl (Phenergan)  25 mg CO Q6H PRN


   PRN Reason: Nausea And Vomiting


Sodium Chloride (Sodium Chloride Flush Syringe 10 Ml)  10 ml IV BID [facility]


   Last Admin: 10/21/19 11:29 Dose:  10 ml


   Documented by: 


Sodium Chloride (Sodium Chloride Flush Syringe 10 Ml)  10 ml IV PRN PRN


   PRN Reason: LINE FLUSH


Tramadol HCl (Ultram)  50 mg PO Q6HR PRN


   PRN Reason: PAIN


   Last Admin: 10/21/19 11:40 Dose:  50 mg


   Documented by: 











Review of Systems


All systems: negative


Cardiovascular: chest pain


Neurological: weakness





Physical Examination





- Vital Signs


Vital Signs: 


                                   Vital Signs











Temp Pulse Resp BP Pulse Ox


 


 98.1 F   73   18   143/81   97 


 


 10/20/19 09:29  10/20/19 09:29  10/20/19 09:29  10/20/19 09:29  10/20/19 09:29














- Physical Exam


Narrative exam: 


Patient is awake, alert, oriented x4, follows complex commands. PERRL, EOMI, no 

facial weakness noted, tongue midline, b/l intact to LT. B/l intact to LT in all

 extremities. B/l intact to FTN and HTN. LUE/LLE: 4/5, RUE/RLE: 5/5. 2+ reflexes

 throughout.








- Constitutional


General appearance: comfortable





- EENT


EENT: Present: ATNC, PERRL, mucous membranes moist, hearing intact, vision 

intact





- Respiratory


Respiratory: Present: lungs clear, normal breath sounds





- Cardiovascular


Cardiovascular: Present: regular rate, normal S1, normal S2


Extremities: Present: no peripheral edema bilatateraly, no clubbing, cyanosis





- Gastrointestinal


Gastrointestinal: Present: normoactive bowel sounds, soft, non-tender





- Integumentary


Integumentary: Present: normal





- Musculoskeletal


Musculoskeletal: Present: no fluid collection, no pain





- Psychiatric


Psychiatric: Present: mood/affect appropriate





- Level of Consciousness


1a. Level of Consciousness: alert/keenly responsive





- LOC Questions


1b. LOC Questions: answers both correctly





- LOC Command


1c. LOC Commands: performs tasks correctly





- Best Gaze


2. Best Gaze: normal





- Visual


3. Visual: no visual loss





- Facial Palsy


4. Facial Palsy: normal symmetrical movement





- Motor Arm


5a. Motor Arm Left: no drift


5b. Motor Arm Right: no drift





- Motor Leg


6a. Motor Leg Left: no drift


6b. Motor Leg Right: no drift





- Limb Ataxia


7. Limb Ataxia: absent





- Sensory


8. Sensory: normal





- Best Language


9. Best Language: no aphasia





- Dysarthria


10. Dysarthria: normal





- Extinction and Inattention


11. Extinction/Inattention: no abnormality





- Scoring


Total Score: 0


Stroke Severity: No Stroke Symptoms





Results





- Laboratory Findings


CBC and BMP: 


                                 10/20/19 10:18





                                 10/21/19 05:40


Abnormal Lab Findings: 


                                  Abnormal Labs











  10/20/19 10/20/19 10/20/19





  09:43 10:18 10:18


 


WBC   2.7 L 


 


MCH   33 H 


 


MCHC   35 H 


 


Lymph % (Auto)   13.3 L 


 


Mono % (Auto)   15.7 H 


 


Lymph #   0.4 L 


 


Sodium    118 L*


 


Chloride    83.6 L


 


Carbon Dioxide   


 


BUN   


 


Creatinine    0.4 L


 


Glucose    127 H


 


POC Glucose  143 H  


 


Uric Acid   


 


Calcium   


 


Total Creatine Kinase   














  10/20/19 10/20/19 10/21/19





  11:11 16:27 05:40


 


WBC   


 


MCH   


 


MCHC   


 


Lymph % (Auto)   


 


Mono % (Auto)   


 


Lymph #   


 


Sodium    129 L D


 


Chloride    97.3 L


 


Carbon Dioxide    20 L


 


BUN    8 L


 


Creatinine    0.4 L


 


Glucose    71 L


 


POC Glucose   143 H 


 


Uric Acid  1.7 L  


 


Calcium    7.8 L


 


Total Creatine Kinase  249 H  














  10/21/19





  11:23


 


WBC 


 


MCH 


 


MCHC 


 


Lymph % (Auto) 


 


Mono % (Auto) 


 


Lymph # 


 


Sodium 


 


Chloride 


 


Carbon Dioxide 


 


BUN 


 


Creatinine 


 


Glucose 


 


POC Glucose  115 H


 


Uric Acid 


 


Calcium 


 


Total Creatine Kinase 














Assessment and Plan


Patient is a 72 y/o man w/ a h/o HTN, HLD, DM, OA, depression, PTSD, ma

lnutrition, who p/w left sided weakness and chest pain. According to the 

patient's clinical findings, it is likely that he has an acute-to-subacute 

stroke.





Plan:


1. Stroke:


- MRI pending


- MRA head pending


- CT head revealed sub-acute Rt. MCA territory infarct


- Echo: EF 55-60%, bubble study negative, LA size normal


- CUS: no significant stenosis


- LDL pending


- Cont. ASA


- Cont. statin


- PT/OT/ST


- DVT Ppx: recommend lovenox


- Telemetry monitoring while in house





2. Hypertension:


- Recommend target BP of <220/120 for next 24 hours. Can target normotension on 

10/22/19.





- Continue to correct metabolic abnormalities per primary team





- Will continue to monitor patient.





Thank you for allowing me to take part in the care of this patient.





Eric Robles MD


Neurology

## 2019-10-21 NOTE — PROGRESS NOTE
Assessment and Plan


Assessment and plan: 


72 YO Male with DM, HTN, OA, HLD, Depression, Severe Malnutrition, PTSD presents

to ED for evaluation. Pt was in his normal state of health around bedtime at 

2130 hours. Pt was awakened from sleep around 0500 hours feeling weak, as well 

as experienced a severe headache and chest discomfort. EMS notified, and upon 

arrival the patient was found to be in distress. Pt seen and evaluated in ED and

underwent CT head which revealed evidence of Subacute CVA. Pt also found to have

hyponatremia. Pt initiated on CVA protocol, and admitted to ACE unit with remote

telemetry. Pt is outside therapeutic window for TPA. Neurology consulted. Pt 

initiated on anitplatelet therapy. Pt denies fever, chills, CP, palpitations, 

NVD, Productive cough, skin rash, or recent ill contacts. Prior admission on 

2/11/19 reviewed. All listed medication reconciled at time of admission. 


* Per ED documentation: Primary complaint of hyperglycemia, headache, and chest 

  pain, ...he had a nuclear stress test earlier on this year negative for acute 

  disease


* He is found to be hyponatremic.  He'll be started on gentle IV hydration.  X-

  ray the chest is unremarkable.  





CT HEAD: Small region of subacute ischemia in the anterior MCA territory on the 

right as described above. No signs of hemorrhagic transformation. Follow-up with

diffusion imaging by MRI, as clinically w arranted.





Carotid US: Pending. 








(1) CVA (cerebral vascular accident)


Current Visit: Yes   Status: Acute   


Qualifiers: 


   Precerebral and cerebral artery: middle cerebral artery   Laterality of 

affected vessel: right 


Plan to address problem: 


Subacute R MCA Infarct:  neuro check, antiplatelet therapy, PT/OT/Speech 

therapy, Echo, carotid doppler, seizure precautions, swallow screening, 

Neurology consulted, further imaging as per neurology team. 








(2) Headache


Current Visit: Yes   Status: Acute   


Qualifiers: 


   Headache chronicity pattern: acute headache 


Plan to address problem: 


Continue Neuro check, pain control








(3) Hyponatremia


Current Visit: Yes   Status: Acute   


Plan to address problem: 


IVF resuscitation therapy, repeat bmp, Nephrology consulted








(4) HTN (hypertension)


Current Visit: Yes   Status: Acute   


Qualifiers: 


   Hypertension type: essential hypertension   Qualified Code(s): I10 - 

Essential (primary) hypertension   


Plan to address problem: 


monitor bp q shift, permissive htn overnight. 








(5) HLD (hyperlipidemia)


Current Visit: Yes   Status: Acute   


Qualifiers: 


   Hyperlipidemia type: mixed hyperlipidemia   Qualified Code(s): E78.2 - Mixed 

hyperlipidemia   


Plan to address problem: 


Lipid panel, statin therapy. 








(6) Diabetes


Current Visit: Yes   Status: Acute   


Plan to address problem: 


ADA diet, insulin sliding scale, accucheck, hypoglycemia protocol








(7) Osteoarthritis


Current Visit: Yes   Status: Acute   


Qualifiers: 


   Laterality: unspecified laterality 


Plan to address problem: 


pain control, nsaid therapy, 








(8) Severe malnutrition


Current Visit: Yes   Status: Acute   


Plan to address problem: 


Encourage increased protein in take, dietary supplementation








(9) Atypical chest pain: ?Costochondritis:


Considering recent negative stress test this year and normal EKG, will have 

patient follow with Cardiology outpatient





(10)DVT prophylaxis


Current Visit: Yes   Status: Acute   


Plan to address problem: 


SCD to BLE while in bed, supportive care





Disposition: Continue inpatient care due to Low Sodium. Discharge in AM if 

sodium corrected





History


Interval history: 


F/U: CVA and Hyponatremia. Generalized weakness.





Hospitalist Physical





- Physical exam


Narrative exam: 


General appearance: Present: Resting comfortable, 





- EENT


Eyes: Present: PERRL


ENT: hearing intact, clear oral mucosa





- Neck


Neck: Present: supple, normal ROM





- Respiratory


Respiratory effort: normal


Respiratory: bilateral: CTA





- Cardiovascular


Heart Sounds: Present: S1 & S2.  Absent: rub, click





- Extremities


Extremities: pulses symmetrical, No edema


Peripheral Pulses: within normal limits





- Abdominal


General gastrointestinal: Present: soft, non-tender, non-distended, normal bowel

sounds


Male genitourinary: Present: normal





- Integumentary


Integumentary: Present: clear, warm, dry





- Musculoskeletal


Musculoskeletal: gait normal, strength equal bilaterally





- Psychiatric


Psychiatric: appropriate mood/affect, intact judgment & insight





- Neurologic


Neurologic: CNII-XII intact, moves all extremities











- Constitutional


Vitals: 


                                        











Temp Pulse Resp BP Pulse Ox


 


 97.7 F   74   20   113/67   97 


 


 10/21/19 02:20  10/21/19 02:20  10/21/19 02:20  10/21/19 02:20  10/21/19 02:20











General appearance: Present: mild distress





Results





- Labs


CBC & Chem 7: 


                                 10/20/19 10:18





                                 10/21/19 05:40


Labs: 


                             Laboratory Last Values











WBC  2.7 K/mm3 (4.5-11.0)  L  10/20/19  10:18    


 


RBC  4.23 M/mm3 (3.65-5.03)   10/20/19  10:18    


 


Hgb  14.0 gm/dl (11.8-15.2)   10/20/19  10:18    


 


Hct  39.5 % (35.5-45.6)   10/20/19  10:18    


 


MCV  93 fl (84-94)   10/20/19  10:18    


 


MCH  33 pg (28-32)  H  10/20/19  10:18    


 


MCHC  35 % (32-34)  H  10/20/19  10:18    


 


RDW  13.6 % (13.2-15.2)   10/20/19  10:18    


 


Plt Count  151 K/mm3 (140-440)   10/20/19  10:18    


 


Lymph % (Auto)  13.3 % (13.4-35.0)  L  10/20/19  10:18    


 


Mono % (Auto)  15.7 % (0.0-7.3)  H  10/20/19  10:18    


 


Eos % (Auto)  0.6 % (0.0-4.3)   10/20/19  10:18    


 


Baso % (Auto)  0.8 % (0.0-1.8)   10/20/19  10:18    


 


Lymph #  0.4 K/mm3 (1.2-5.4)  L  10/20/19  10:18    


 


Mono #  0.4 K/mm3 (0.0-0.8)   10/20/19  10:18    


 


Eos #  0.0 K/mm3 (0.0-0.4)   10/20/19  10:18    


 


Baso #  0.0 K/mm3 (0.0-0.1)   10/20/19  10:18    


 


Seg Neutrophils %  69.6 % (40.0-70.0)   10/20/19  10:18    


 


Seg Neutrophils #  1.9 K/mm3 (1.8-7.7)   10/20/19  10:18    


 


Sodium  129 mmol/L (137-145)  L D 10/21/19  05:40    


 


Potassium  3.7 mmol/L (3.6-5.0)   10/21/19  05:40    


 


Chloride  97.3 mmol/L ()  L  10/21/19  05:40    


 


Carbon Dioxide  20 mmol/L (22-30)  L  10/21/19  05:40    


 


Anion Gap  15 mmol/L  10/21/19  05:40    


 


BUN  8 mg/dL (9-20)  L  10/21/19  05:40    


 


Creatinine  0.4 mg/dL (0.8-1.5)  L  10/21/19  05:40    


 


Estimated GFR  > 60 ml/min  10/21/19  05:40    


 


BUN/Creatinine Ratio  20 %  10/21/19  05:40    


 


Glucose  71 mg/dL ()  L  10/21/19  05:40    


 


POC Glucose  80  ()   10/20/19  23:21    


 


Uric Acid  1.7 mg/dL (3.5-7.6)  L  10/20/19  11:11    


 


Calcium  7.8 mg/dL (8.4-10.2)  L  10/21/19  05:40    


 


Magnesium  2.00 mg/dL (1.7-2.3)   10/20/19  11:11    


 


Total Bilirubin  0.30 mg/dL (0.1-1.2)   10/20/19  11:11    


 


Direct Bilirubin  < 0.2 mg/dL (0-0.2)   10/20/19  11:11    


 


AST  23 units/L (5-40)   10/20/19  11:11    


 


ALT  16 units/L (7-56)   10/20/19  11:11    


 


Alkaline Phosphatase  68 units/L ()   10/20/19  11:11    


 


Total Creatine Kinase  249 units/L ()  H  10/20/19  11:11    


 


Troponin T  < 0.010 ng/mL (0.00-0.029)   10/20/19  16:15    


 


Total Protein  7.0 g/dL (6.3-8.2)   10/20/19  11:11    


 


Albumin  4.5 g/dL (3.9-5)   10/20/19  11:11    


 


Albumin/Globulin Ratio  1.8 %  10/20/19  11:11    


 


TSH  1.970 mlU/mL (0.270-4.200)   10/20/19  11:11    


 


Urine Color  Colorless  (Yellow)   10/20/19  Unknown


 


Urine Turbidity  Clear  (Clear)   10/20/19  Unknown


 


Urine pH  7.0  (5.0-7.0)   10/20/19  Unknown


 


Ur Specific Gravity  1.005  (1.003-1.030)   10/20/19  Unknown


 


Urine Protein  <15 mg/dl mg/dL (Negative)   10/20/19  Unknown


 


Urine Glucose (UA)  Neg mg/dL (Negative)   10/20/19  Unknown


 


Urine Ketones  20 mg/dL (Negative)   10/20/19  Unknown


 


Urine Blood  Neg  (Negative)   10/20/19  Unknown


 


Urine Nitrite  Neg  (Negative)   10/20/19  Unknown


 


Urine Bilirubin  Neg  (Negative)   10/20/19  Unknown


 


Urine Urobilinogen  < 2.0 mg/dL (<2.0)   10/20/19  Unknown


 


Ur Leukocyte Esterase  Neg  (Negative)   10/20/19  Unknown


 


Urine WBC (Auto)  < 1.0 /HPF (0.0-6.0)   10/20/19  Unknown


 


Urine RBC (Auto)  < 1.0 /HPF (0.0-6.0)   10/20/19  Unknown


 


Urine Osmolality  256 Mosm/kg  10/20/19  Unknown


 


Urine Creatinine  17.8 mg/dL (0.1-20.0)   10/20/19  Unknown














Active Medications





- Current Medications


Current Medications: 














Generic Name Dose Route Start Last Admin





  Trade Name Freq  PRN Reason Stop Dose Admin


 


Acetaminophen  650 mg  10/20/19 13:33 





  Tylenol  PO  





  Q4H PRN  





  Pain, Mild (1-3)  


 


Albuterol  2.5 mg  10/20/19 12:13 





  Proventil  IH  





  Q3H PRN  





  Shortness Of Breath  


 


Alprazolam  0.5 mg  10/20/19 13:34 





  Xanax  PO  





  BID PRN  





  Agitation  


 


Aspirin  325 mg  10/21/19 10:00 





  Aspirin  PO  





  QDAY BONNIE  


 


Atorvastatin Calcium  40 mg  10/20/19 22:00  10/20/19 23:08





  Lipitor  PO   40 mg





  QHS BONNIE   Administration


 


Bisacodyl  10 mg  10/20/19 13:33 





  Dulcolax  AL  





  QDAY PRN  





  Constipation  


 


Cyclobenzaprine HCl  10 mg  10/20/19 22:00  10/20/19 23:08





  Flexeril  PO   10 mg





  HS BONNIE   Administration


 


Dextrose  50 ml  10/20/19 13:37 





  D50w (25gm) Syringe  IV  





  Q30MIN PRN  





  Hypoglycemia  


 


Famotidine  40 mg  10/20/19 22:00  10/20/19 23:08





  Pepcid  PO   40 mg





  QHS BONNIE   Administration


 


Gabapentin  300 mg  10/21/19 10:00 





  Neurontin  PO  





  DAILY BONNIE  


 


Sodium Chloride  1,000 mls @ 75 mls/hr  10/20/19 13:00  10/21/19 03:03





  Nacl 0.9% 1000 Ml  IV   75 mls/hr





  AS DIRECT BONNIE   Administration


 


Insulin Human Lispro  0 unit  10/20/19 16:30  10/20/19 23:15





  Humalog  SUB-Q   Not Given





  [facility]  





  Protocol  


 


Magnesium Hydroxide  30 ml  10/20/19 13:33 





  Milk Of Magnesia  PO  





  Q4H PRN  





  Constipation  


 


Metoclopramide HCl  10 mg  10/20/19 13:33 





  Reglan  PO  





  Q6H PRN  





  Nausea And Vomiting  


 


Ondansetron HCl  4 mg  10/20/19 13:33 





  Zofran  IV  





  Q8H PRN  





  Nausea And Vomiting  


 


Ondansetron HCl  4 mg  10/20/19 13:34 





  Zofran Odt  PO  





  Q8HR PRN  





  Nausea And Vomiting  


 


Prazosin HCl  1 mg  10/20/19 22:00  10/20/19 23:08





  Minipress  PO   1 mg





  HS BONNIE   Administration


 


Promethazine HCl  25 mg  10/20/19 13:33 





  Phenergan  AL  





  Q6H PRN  





  Nausea And Vomiting  


 


Sodium Chloride  10 ml  10/20/19 22:00  10/20/19 23:09





  Sodium Chloride Flush Syringe 10 Ml  IV   10 ml





  BID BONNIE   Administration


 


Sodium Chloride  10 ml  10/20/19 12:13 





  Sodium Chloride Flush Syringe 10 Ml  IV  





  PRN PRN  





  LINE FLUSH  


 


Sodium Chloride  10 ml  10/20/19 13:33 





  Sodium Chloride Flush Syringe 10 Ml  IV  





  PRN PRN  





  LINE FLUSH  


 


Tramadol HCl  50 mg  10/20/19 13:34 





  Ultram  PO  





  Q6HR PRN  





  PAIN

## 2019-10-21 NOTE — VASCULAR LAB REPORT
DUPLEX DOPPLER ULTRASOUND CAROTID, BILATERAL



INDICATION:

stroke.



FINDINGS:



RIGHT CAROTID: Mild plaque



Right CCA velocity: 83 cm/sec.

Right ICA peak systolic velocity: 68 cm/sec.

ICA/CCA PSV Ratio: 0.8.



Right Vertebral Artery: Antegrade flow.



LEFT CAROTID: Mild plaque



Left CCA velocity: 88 cm/sec.

Left ICA peak systolic velocity: 110 cm/sec.

ICA/CCA PSV Ratio: 1.26.



Left Vertebral Artery: Antegrade flow.



IMPRESSION:

1. Right Internal Carotid Artery: Less than 50% diameter stenosis.

2. Left Internal Carotid Artery: Less than 50% diameter stenosis.







Velocity criteria are extrapolated from diameter data as defined by the Society of Radiologists in Ul
trasound Consensus Conference, Radiology 2003; 229;340-346.



Degree of Stenosis (%) || ICA PSV (cm/sec) || Plaque estimate (%) || ICA/CCA PSV Ratio

           Normal                                <125                           None                 
                 <2.0  

             <50                                   <125                            <50               
                     <2.0

            50-69                               125-230                        50                    
               2.0-4.0

 70 but less than 100                  >230                            50                            
        >4.0

     Near occlusion                 High, low, or none            visible                            
     variable 

     Total occlusion                         None                    visible; no lumen               
        N/A

 



Signer Name: Brian Figueroa MD 

Signed: 10/21/2019 2:25 PM

 Workstation Name: [id number]

## 2019-10-22 VITALS — DIASTOLIC BLOOD PRESSURE: 93 MMHG | SYSTOLIC BLOOD PRESSURE: 158 MMHG

## 2019-10-22 LAB
BUN SERPL-MCNC: 10 MG/DL (ref 9–20)
BUN/CREAT SERPL: 25 %
CALCIUM SERPL-MCNC: 7.8 MG/DL (ref 8.4–10.2)
HEMOLYSIS INDEX: 45

## 2019-10-22 RX ADMIN — GABAPENTIN SCH MG: 300 CAPSULE ORAL at 10:03

## 2019-10-22 RX ADMIN — Medication SCH ML: at 10:03

## 2019-10-22 RX ADMIN — SODIUM CHLORIDE SCH MLS/HR: 0.9 INJECTION, SOLUTION INTRAVENOUS at 02:42

## 2019-10-22 RX ADMIN — ASPIRIN SCH MG: 325 TABLET ORAL at 10:03

## 2019-10-22 NOTE — MAGNETIC RESONANCE REPORT
MRI BRAIN WITHOUT CONTRAST 



INDICATION / CLINICAL INFORMATION:

stroke.



TECHNIQUE:

Multisequence, multiplanar images were obtained.



COMPARISON: 

CT head dated 10/20/2019



FINDINGS:

CEREBRAL and CEREBELLAR HEMISPHERES: No evidence for diffusion restriction. Increased diffusion signa
l in the previously described subacute to chronic infarct in the right frontal lobe secondary to T2 s
torsten through artifact. No decreased signal on the ADC map. In my opinion the right frontal ischemic i
nsult is chronic and measures 3.1 x 2.7 cm in axial plane. No acute ischemia, hemorrhage, mass or mas
s effect. Mild chronic periventricular white matter changes are noted.  No diffusion restriction to s
uggest acute infarct.  No extra-axial fluid collection.

VENTRICLES: Normal in size and configuration for age.  

 

VISUALIZED ORBITS: No significant abnormality.

VISUALIZED PARANASAL SINUSES: Mild mucosal thickening in the ethmoid and maxillary sinuses. The masto
id air cells are well-aerated.



ADDITIONAL FINDINGS: None.



IMPRESSION:

No acute intracranial abnormality. Chronic right frontal infarct as described.







MRA HEAD WITHOUT CONTRAST



HISTORY: Stroke



COMPARISON: MR brain performed the same day



TECHNIQUE: Routine MRA of the head is performed. 3-D/MIP reformats postprocessed.



CONTRAST: None.



FINDINGS:

Intracranial vertebral arteries: No significant abnormality.

Basilar artery: No significant abnormality.

Posterior cerebral arteries: No significant abnormality.



Intracranial internal carotid arteries: No significant abnormality.

Anterior cerebral arteries: No significant abnormality.

Middle cerebral arteries: No significant abnormality.



Additional findings: None. 



IMPRESSION:

No significant abnormality.



Signer Name: Alejandro Davison Jr, MD 

Signed: 10/22/2019 10:00 AM

 Workstation Name: [id number]

## 2019-10-22 NOTE — PROGRESS NOTE
Assessment and Plan


Patient is a 72 y/o man w/ a h/o HTN, HLD, DM, OA, depression, PTSD, 

malnutrition, who p/w left sided weakness and chest pain. According to the 

patient's clinical findings, it is likely that he has an acute-to-subacute 

stroke.





Plan:


1. Stroke:


- MRI reveals subacute-to-chronic infarct in Rt. MCA territory


- MRA head: no significant stenosis


- CT head revealed sub-acute Rt. MCA territory infarct


- Echo: EF 55-60%, bubble study negative, LA size normal


- CUS: no significant stenosis


- LDL 72


- Cont. ASA


- Cont. statin


- PT/OT/ST


- DVT Ppx: recommend lovenox


- Telemetry monitoring while in house


- Etiology of stroke is cryptogenic, as no intra/extra-cranial stenosis. 

Therefore recommend for patient to have long-term cardiac monitoring with either

ILR or 30-day MCOT as outpatient. 


- If infarct is subacute/chronic, then worsening of symptoms may be due to 

recrudescence of previous stroke symptoms in setting of electrolyte abnorma

lities. 





2. Hypertension:


- Recommend target BP of normotension.





- Continue to correct metabolic abnormalities per primary team





- Will sign off, as investigations complete and treatment plan is in place. 

Please call with any questions. 





Thank you for allowing me to take part in the care of this patient.





Eric Robles MD


Neurology








Subjective


Date of service: 10/22/19


Principal diagnosis: Stroke


Interval history: 


No acute events overnight. 








Objective





- Exam


Narrative Exam: 


Patient is awake, alert, oriented x4, follows complex commands. PERRL, EOMI, no 

facial weakness noted, tongue midline, b/l intact to LT. B/l intact to LT in all

extremities. B/l intact to FTN and HTN. LUE/LLE: 4/5, RUE/RLE: 5/5. 2+ reflexes 

throughout.








- Vital Sign


                               Vital Signs - 12hr











  10/22/19 10/22/19 10/22/19





  02:57 07:12 10:00


 


Temperature 97.2 F L 98.0 F 


 


Pulse Rate 69 74 81


 


Respiratory 18 20 





Rate   


 


Blood Pressure 122/81 136/80 


 


O2 Sat by Pulse 100 99 





Oximetry   














- General Apperance


Constitutional: comfortable





- EENT


EENT: ATNC, PERRL, mucous membranes moist, hearing intact, vision intact





- Respiratory


Respiratory: lungs clear, normal breath sounds





- Cardiovascular


Cardiovascular: regular rate, normal S1, normal S2


Extremities: no clubbing, cyanosis, no inflammation





- Gastrointestinal


Gastrointestinal: normoactive bowel sounds, soft, non-tender





- Integumentary


Integumentary: normal





- Musculoskeletal


Musculoskeletal: no fluid collection, no pain





- Psychiatric


Psychiatric: mood/affect appropriate





- Laboratory Findings


CBC and BMP: 


                                 10/20/19 10:18





                                 10/22/19 07:45


Abnormal Lab Findings: 


                                  Abnormal Labs











  10/20/19 10/20/19 10/20/19





  09:43 10:18 10:18


 


WBC   2.7 L 


 


MCH   33 H 


 


MCHC   35 H 


 


Lymph % (Auto)   13.3 L 


 


Mono % (Auto)   15.7 H 


 


Lymph #   0.4 L 


 


Sodium    118 L*


 


Chloride    83.6 L


 


Carbon Dioxide   


 


BUN   


 


Creatinine    0.4 L


 


Glucose    127 H


 


POC Glucose  143 H  


 


Uric Acid   


 


Calcium   


 


Total Creatine Kinase   














  10/20/19 10/20/19 10/21/19





  11:11 16:27 05:40


 


WBC   


 


MCH   


 


MCHC   


 


Lymph % (Auto)   


 


Mono % (Auto)   


 


Lymph #   


 


Sodium    129 L D


 


Chloride    97.3 L


 


Carbon Dioxide    20 L


 


BUN    8 L


 


Creatinine    0.4 L


 


Glucose    71 L


 


POC Glucose   143 H 


 


Uric Acid  1.7 L  


 


Calcium    7.8 L


 


Total Creatine Kinase  249 H  














  10/21/19 10/21/19 10/22/19





  11:23 21:51 07:45


 


WBC   


 


MCH   


 


MCHC   


 


Lymph % (Auto)   


 


Mono % (Auto)   


 


Lymph #   


 


Sodium    130 L


 


Chloride    96.9 L


 


Carbon Dioxide    21 L


 


BUN   


 


Creatinine    0.4 L


 


Glucose    101 H


 


POC Glucose  115 H  149 H 


 


Uric Acid   


 


Calcium    7.8 L


 


Total Creatine Kinase   














  10/22/19





  11:13


 


WBC 


 


MCH 


 


MCHC 


 


Lymph % (Auto) 


 


Mono % (Auto) 


 


Lymph # 


 


Sodium 


 


Chloride 


 


Carbon Dioxide 


 


BUN 


 


Creatinine 


 


Glucose 


 


POC Glucose  131 H


 


Uric Acid 


 


Calcium 


 


Total Creatine Kinase

## 2019-10-22 NOTE — MAGNETIC RESONANCE REPORT
MRI BRAIN WITHOUT CONTRAST 



INDICATION / CLINICAL INFORMATION:

stroke.



TECHNIQUE:

Multisequence, multiplanar images were obtained.



COMPARISON: 

CT head dated 10/20/2019



FINDINGS:

CEREBRAL and CEREBELLAR HEMISPHERES: No evidence for diffusion restriction. Increased diffusion signa
l in the previously described subacute to chronic infarct in the right frontal lobe secondary to T2 s
torsten through artifact. No decreased signal on the ADC map. In my opinion the right frontal ischemic i
nsult is chronic and measures 3.1 x 2.7 cm in axial plane. No acute ischemia, hemorrhage, mass or mas
s effect. Mild chronic periventricular white matter changes are noted.  No diffusion restriction to s
uggest acute infarct.  No extra-axial fluid collection.

VENTRICLES: Normal in size and configuration for age.  

 

VISUALIZED ORBITS: No significant abnormality.

VISUALIZED PARANASAL SINUSES: Mild mucosal thickening in the ethmoid and maxillary sinuses. The masto
id air cells are well-aerated.



ADDITIONAL FINDINGS: None.



IMPRESSION:

No acute intracranial abnormality. Chronic right frontal infarct as described.







MRA HEAD WITHOUT CONTRAST



HISTORY: Stroke



COMPARISON: MR brain performed the same day



TECHNIQUE: Routine MRA of the head is performed. 3-D/MIP reformats postprocessed.



CONTRAST: None.



FINDINGS:

Intracranial vertebral arteries: No significant abnormality.

Basilar artery: No significant abnormality.

Posterior cerebral arteries: No significant abnormality.



Intracranial internal carotid arteries: No significant abnormality.

Anterior cerebral arteries: No significant abnormality.

Middle cerebral arteries: No significant abnormality.



Additional findings: None. 



IMPRESSION:

No significant abnormality.



Signer Name: Alejandro Davison Jr, MD 

Signed: 10/22/2019 10:00 AM

 Workstation Name: [id number]

## 2019-10-22 NOTE — DISCHARGE SUMMARY
Providers





- Providers


Date of Admission: 


10/20/19 12:13





Attending physician: 


CATHRYN PETERSEN MD





                                        





10/20/19 13:33


Occupational Therapy Evaluate and Treat [CONS] Routine 


   Comment: 


   Reason For Exam: Neuro deficits


Physical Therapy Evaluation and Treat [CONS] Routine 


   Comment: 


   Reason For Exam: Neuro deficits





10/20/19 20:31


Consult to Physician [CONS] Routine 


   Comment: noted/ theresa


   Consulting Provider: SOLO DUNN


   Physician Instructions: 


   Reason For Exam: cva





10/21/19 07:44


Consult to Physician [CONS] Routine 


   Comment: 


   Consulting Provider: SOLO DUNN


   Physician Instructions: 


   Reason For Exam: cva











Primary care physician: 


[facility], [place], [place]








Hospitalization


Reason for admission: Chronic CVA, hyponatremia


Condition: Stable


Pertinent studies: 





MRI/MRA


CT head


Carotid doppler


Hospital course: 





74 YO Male with DM, HTN, OA, HLD, Depression, Severe Malnutrition, PTSD presents

 to ED for evaluation. Pt was in his normal state of health around bedtime at 

2130 hours. Pt was awakened from sleep around 0500 hours feeling weak, as well 

as experienced a severe headache and chest discomfort. EMS notified, and upon 

arrival the patient was found to be in distress. Pt seen and evaluated in ED and

 underwent CT head which revealed evidence of Subacute CVA. Pt also found to 

have hyponatremia. Pt initiated on CVA protocol, and admitted to ACE unit with 

remote telemetry. Pt is outside therapeutic window for TPA. Neurology consulted.

 Pt initiated on anitplatelet therapy. Pt denies fever, chills, CP, 

palpitations, NVD, Productive cough, skin rash, or recent ill contacts. Prior 

admission on 2/11/19 reviewed. All listed medication reconciled at time of 

admission. 


* Per ED documentation: Primary complaint of hyperglycemia, headache, and chest 

  pain, ...he had a nuclear stress test earlier on this year negative for acute 

  disease


* He is found to be hyponatremic.  He'll be started on gentle IV hydration.  X-

  ray the chest is unremarkable.  





CT HEAD: Small region of subacute ischemia in the anterior MCA territory on the 

right as described above. No signs of hemorrhagic transformation. Follow-up with

 diffusion imaging by MRI, as clinically w arranted.





Carotid US: <50% stenosis bilaterally.








(1) CVA (cerebral vascular accident)


MRI/MRA was done and showed chronic ischemic changes


Neurology said no acute CVA it is chronic and recommend to follow with 

cardiology for event monitor and neurology.





(2) Headache


-Resolved








(3) Hyponatremia


- Patient was treated with IV fluids and improved. Discharged with few days of 

salt tablets.





(4) HTN (hypertension)


- Controlled








(5) HLD (hyperlipidemia)


Continue statin








(6) Diabetes


continue home meds.








(7) Osteoarthritis


pain control, nsaid therapy, 








(8) Severe malnutrition


Encourage increased protein in take, dietary supplementation








(9) Atypical chest pain: ?Costochondritis:


Considering recent negative stress test this year and normal EKG, will have 

patient follow with Cardiology outpatient


Disposition: [id number] TO HOME OR SELFCARE


Time spent for discharge: 32 minutes





- Discharge Diagnoses


(1) CVA (cerebral vascular accident)


Status: Acute   


Qualifiers: 


   Precerebral and cerebral artery: middle cerebral artery   Laterality of 

affected vessel: right 





(2) HLD (hyperlipidemia)


Status: Acute   


Qualifiers: 


   Hyperlipidemia type: mixed hyperlipidemia   Qualified Code(s): E78.2 - Mixed 

hyperlipidemia   





(3) HTN (hypertension)


Status: Acute   


Qualifiers: 


   Hypertension type: essential hypertension   Qualified Code(s): I10 - 

Essential (primary) hypertension   





(4) Hyponatremia


Status: Acute   





(5) Severe malnutrition


Status: Acute   





Core Measure Documentation





- Palliative Care


Palliative Care/ Comfort Measures: Not Applicable





- Core Measures


Any of the following diagnoses?: stroke





- Stroke Discharge Requirements


Statin for LDL = or >70 mg/dl on DC: Yes


Anticoag for atrial fib/atrial flutter: Not Applicable


Antithrombotic for ischemic stroke: Yes





Exam





- Physical Exam


Narrative exam: 


 Not in cardiopulmonary distress. 


 The patient appeared well nourished and normally developed.


 Vital signs as documented.


 Head exam is unremarkable.


 No scleral icterus .


 Neck is without jugular venous distension, thyromegaly, or carotid bruits. 


 Lungs are clear to auscultation.


Cardiac exam reveals regular rate and  Rhythm. 


Abdominal exam reveals normal bowel sounds. 


Extremities are nonedematous and both femoral and pedal pulses are normal.


CNS: Alert and oriented 3.  No focal weakness.





- Constitutional


Vitals: 


                                        











Temp Pulse Resp BP Pulse Ox


 


 98.0 F   81   20   136/80   99 


 


 10/22/19 07:12  10/22/19 10:00  10/22/19 07:12  10/22/19 07:12  10/22/19 07:12














Plan


Activity: no restrictions


Weight Bearing Status: Full Weight Bearing


Diet: diabetic


Follow up with: 


[facility] [place] [Primary Care Provider] - 7 Days


CHARLES VASQUEZ MD [Staff Physician] - 7 Days (Neurology wants the patient to 

be followed with cardiology for event monitor.)


TIKA CANELA MD [Staff Physician] - 10 Days


Prescriptions: 


AtorvaSTATin [Lipitor] 40 mg PO QHS #30 tablet


Aspirin 325 mg PO QDAY #30 tablet


Sodium Chloride 1 gm PO BID #6 tablet

## 2019-11-10 ENCOUNTER — HOSPITAL ENCOUNTER (INPATIENT)
Dept: HOSPITAL 5 - ED | Age: 74
LOS: 3 days | Discharge: HOME | DRG: 643 | End: 2019-11-13
Attending: INTERNAL MEDICINE | Admitting: INTERNAL MEDICINE
Payer: MEDICARE

## 2019-11-10 DIAGNOSIS — Z79.899: ICD-10-CM

## 2019-11-10 DIAGNOSIS — F32.9: ICD-10-CM

## 2019-11-10 DIAGNOSIS — Z82.49: ICD-10-CM

## 2019-11-10 DIAGNOSIS — N21.0: ICD-10-CM
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
## 2019-11-10 DIAGNOSIS — M19.90: ICD-10-CM

## 2019-11-10 DIAGNOSIS — E11.9: ICD-10-CM

## 2019-11-10 DIAGNOSIS — E87.2: ICD-10-CM

## 2019-11-10 DIAGNOSIS — K21.9: ICD-10-CM

## 2019-11-10 DIAGNOSIS — F43.10: ICD-10-CM

## 2019-11-10 DIAGNOSIS — Z83.3: ICD-10-CM

## 2019-11-10 DIAGNOSIS — I10: ICD-10-CM

## 2019-11-10 DIAGNOSIS — E87.6: ICD-10-CM

## 2019-11-10 DIAGNOSIS — Z79.82: ICD-10-CM

## 2019-11-10 DIAGNOSIS — E43: ICD-10-CM

## 2019-11-10 DIAGNOSIS — N20.0: ICD-10-CM

## 2019-11-10 DIAGNOSIS — E78.5: ICD-10-CM

## 2019-11-10 DIAGNOSIS — E22.2: Primary | ICD-10-CM

## 2019-11-10 DIAGNOSIS — T50.2X5A: ICD-10-CM

## 2019-11-10 LAB
ALBUMIN SERPL-MCNC: 3.9 G/DL (ref 3.9–5)
ALBUMIN SERPL-MCNC: 4.6 G/DL (ref 3.9–5)
ALT SERPL-CCNC: 17 UNITS/L (ref 7–56)
ALT SERPL-CCNC: 19 UNITS/L (ref 7–56)
BILIRUB DIRECT SERPL-MCNC: < 0.2 MG/DL (ref 0–0.2)
BILIRUB UR QL STRIP: (no result)
BLOOD UR QL VISUAL: (no result)
BUN SERPL-MCNC: 11 MG/DL (ref 9–20)
BUN SERPL-MCNC: 11 MG/DL (ref 9–20)
BUN/CREAT SERPL: 22 %
BUN/CREAT SERPL: 22 %
CALCIUM SERPL-MCNC: 8.4 MG/DL (ref 8.4–10.2)
CALCIUM SERPL-MCNC: 8.8 MG/DL (ref 8.4–10.2)
CAOX CRY #/AREA URNS HPF: (no result) /[HPF]
HCT VFR BLD CALC: 42.6 % (ref 35.5–45.6)
HEMOLYSIS INDEX: 68
HEMOLYSIS INDEX: 8
HGB BLD-MCNC: 14.8 GM/DL (ref 11.8–15.2)
MCHC RBC AUTO-ENTMCNC: 35 % (ref 32–34)
MCV RBC AUTO: 94 FL (ref 84–94)
MUCOUS THREADS #/AREA URNS HPF: (no result) /HPF
PH UR STRIP: 7 [PH] (ref 5–7)
PLATELET # BLD: 189 K/MM3 (ref 140–440)
PROT UR STRIP-MCNC: (no result) MG/DL
RBC # BLD AUTO: 4.53 M/MM3 (ref 3.65–5.03)
RBC #/AREA URNS HPF: < 1 /HPF (ref 0–6)
UROBILINOGEN UR-MCNC: 2 MG/DL (ref ?–2)
WBC #/AREA URNS HPF: < 1 /HPF (ref 0–6)

## 2019-11-10 PROCEDURE — 84443 ASSAY THYROID STIM HORMONE: CPT

## 2019-11-10 PROCEDURE — 80076 HEPATIC FUNCTION PANEL: CPT

## 2019-11-10 PROCEDURE — 84484 ASSAY OF TROPONIN QUANT: CPT

## 2019-11-10 PROCEDURE — 82607 VITAMIN B-12: CPT

## 2019-11-10 PROCEDURE — 81001 URINALYSIS AUTO W/SCOPE: CPT

## 2019-11-10 PROCEDURE — 80048 BASIC METABOLIC PNL TOTAL CA: CPT

## 2019-11-10 PROCEDURE — 93010 ELECTROCARDIOGRAM REPORT: CPT

## 2019-11-10 PROCEDURE — 82747 ASSAY OF FOLIC ACID RBC: CPT

## 2019-11-10 PROCEDURE — 84300 ASSAY OF URINE SODIUM: CPT

## 2019-11-10 PROCEDURE — 74176 CT ABD & PELVIS W/O CONTRAST: CPT

## 2019-11-10 PROCEDURE — 83690 ASSAY OF LIPASE: CPT

## 2019-11-10 PROCEDURE — 71046 X-RAY EXAM CHEST 2 VIEWS: CPT

## 2019-11-10 PROCEDURE — 82962 GLUCOSE BLOOD TEST: CPT

## 2019-11-10 PROCEDURE — 83735 ASSAY OF MAGNESIUM: CPT

## 2019-11-10 PROCEDURE — 80307 DRUG TEST PRSMV CHEM ANLYZR: CPT

## 2019-11-10 PROCEDURE — 93005 ELECTROCARDIOGRAM TRACING: CPT

## 2019-11-10 PROCEDURE — 80053 COMPREHEN METABOLIC PANEL: CPT

## 2019-11-10 PROCEDURE — 85025 COMPLETE CBC W/AUTO DIFF WBC: CPT

## 2019-11-10 PROCEDURE — 83930 ASSAY OF BLOOD OSMOLALITY: CPT

## 2019-11-10 PROCEDURE — 85027 COMPLETE CBC AUTOMATED: CPT

## 2019-11-10 PROCEDURE — 84100 ASSAY OF PHOSPHORUS: CPT

## 2019-11-10 PROCEDURE — 83935 ASSAY OF URINE OSMOLALITY: CPT

## 2019-11-10 PROCEDURE — 36415 COLL VENOUS BLD VENIPUNCTURE: CPT

## 2019-11-10 NOTE — CAT SCAN REPORT
CT ABDOMEN AND PELVIS WITHOUT CONTRAST



INDICATION / CLINICAL INFORMATION:

abd pain. Diffuse abdominal pain.



TECHNIQUE:

Axial CT images were obtained through the abdomen and pelvis without IV contrast.  All CT scans at 
is location are performed using CT dose reduction for ALARA by means of automated exposure control. 



COMPARISON:

None available.



FINDINGS:

LOWER CHEST: No significant abnormality.

LIVER: No significant abnormality.

GALLBLADDER: No significant abnormality.  

BILE DUCTS: No significant abnormality.

PANCREAS: No significant abnormality.

SPLEEN: No significant abnormality.

ADRENALS: No significant abnormality.

RIGHT KIDNEY and URETER: No significant abnormality.

LEFT KIDNEY and URETER: No significant abnormality.



STOMACH and SMALL BOWEL: No significant abnormality. 

COLON: No significant abnormality. 

APPENDIX: No significant abnormality.  

PERITONEUM: No free fluid. No free air. No fluid collection.

LYMPH NODES: No significant adenopathy.

AORTA and ARTERIES: No significant abnormality. 

IVC and VEINS: No significant abnormality.



URINARY BLADDER: There is a focal 3 mm stone identified at the right ureterovesical junction but this
 does not appear to be causing significant obstruction. There are several passed stones throughout 
e urinary bladder lumen measuring in size between 2 and 3 mm in diameter..

REPRODUCTIVE ORGANS: No significant abnormality.



ADDITIONAL FINDINGS: The exam was somewhat limited secondary to diffuse respiratory motion artifact t
hroughout much of the exam.



SKELETAL SYSTEM: No significant abnormality.



IMPRESSION:

Multiple stones identified along the dependent portion of the urinary bladder lumen, nonspecific. The
re appears to be a stone near the right ureterovesical junction however there is no evidence of hydro
nephrosis within either kidney. Please see above comments.



Signer Name: Reinaldo Elam MD 

Signed: 11/10/2019 9:50 PM

 Workstation Name: [id number]

## 2019-11-10 NOTE — EMERGENCY DEPARTMENT REPORT
ED General Adult HPI





- General


Chief complaint: High BP


Stated complaint: HTN


Time Seen by Provider: 11/10/19 18:57


Source: EMS


Mode of arrival: Ambulatory


Limitations: Language Barrier





- History of Present Illness


Initial comments: 


Mr Melissa is a 74 y/o [place] speaking male, with hx of htn and GERD, PCP is 

Bong Amor,  line was used for this patient, he does speak some english

sufficient for accurate information gathering and care. Pt was brought in by ems

for htn and abd pain , pt states abdominal cramping and pain generalized x 3 

weeks. pt states nausea no vomiting. There is no fever no chills  no sob, no 

back pain , 





Onset/Timin


-: week(s)


Location: abdomen


Radiation: non-radiation


Severity scale (0 -10): 5


Quality: aching, other (cramping )


Consistency: intermittent


Improves with: none


Worsens with: none


Associated Symptoms: nausea/vomiting


Treatments Prior to Arrival: none





- Related Data


                                Home Medications











 Medication  Instructions  Recorded  Confirmed  Last Taken


 


Metoprolol [Lopressor TAB] 25 mg PO DAILY 12/21/18 10/21/19 Unknown


 


Ranitidine HCl [Acid Reducer] 150 mg PO BID 12/21/18 10/21/19 Unknown


 


Citalopram [celeXA] 20 mg PO QDAY 11/10/19 11/10/19 Unknown


 


Tamsulosin [Flomax] 0.4 mg PO QDAY 11/10/19 11/10/19 Unknown








                                  Previous Rx's











 Medication  Instructions  Recorded  Last Taken  Type


 


metFORMIN XR [Glucophage XR] 500 mg PO QDDIAB #60 tablet 18 Unknown Rx


 


Aspirin 325 mg PO QDAY #30 tablet 10/22/19 Unknown Rx


 


AtorvaSTATin [Lipitor] 40 mg PO QHS #30 tablet 10/22/19 Unknown Rx











                                    Allergies











Allergy/AdvReac Type Severity Reaction Status Date / Time


 


No Known Allergies Allergy   Verified 10/20/19 09:29














ED Review of Systems


ROS: 


Stated complaint: HTN


Other details as noted in HPI





Constitutional: denies: chills, fever


Eyes: denies: eye pain, eye discharge, vision change


ENT: denies: ear pain, throat pain


Respiratory: denies: cough, shortness of breath, wheezing


Cardiovascular: denies: chest pain, palpitations


Endocrine: no symptoms reported


Gastrointestinal: abdominal pain, nausea.  denies: vomiting


Genitourinary: denies: urgency, dysuria


Musculoskeletal: denies: back pain, joint swelling, arthralgia


Skin: as per HPI


Neurological: denies: headache, weakness, paresthesias


Psychiatric: denies: anxiety, depression


Hematological/Lymphatic: denies: easy bleeding, easy bruising





ED Past Medical Hx





- Past Medical History


Hx Hypertension: Yes


Hx Heart Attack/AMI: No


Hx Congestive Heart Failure: No


Hx Diabetes: Yes


Hx Deep Vein Thrombosis: No


Hx Liver Disease: No


Hx Arthritis: Yes


Additional medical history: high cholesterol, sinusitis,insomnia





- Surgical History


Hx Coronary Stent: No


Hx Pacemaker: No


Hx Internal Defibrillator: No





- Social History


Smoking Status: Never Smoker


Substance Use Type: None





- Medications


Home Medications: 


                                Home Medications











 Medication  Instructions  Recorded  Confirmed  Last Taken  Type


 


Metoprolol [Lopressor TAB] 25 mg PO DAILY 12/21/18 10/21/19 Unknown History


 


Ranitidine HCl [Acid Reducer] 150 mg PO BID 12/21/18 10/21/19 Unknown History


 


metFORMIN XR [Glucophage XR] 500 mg PO QDDIAB #60 tablet 12/22/18 10/21/19 

Unknown Rx


 


Aspirin 325 mg PO QDAY #30 tablet 10/22/19  Unknown Rx


 


AtorvaSTATin [Lipitor] 40 mg PO QHS #30 tablet 10/22/19  Unknown Rx


 


Citalopram [celeXA] 20 mg PO QDAY 11/10/19 11/10/19 Unknown History


 


Tamsulosin [Flomax] 0.4 mg PO QDAY 11/10/19 11/10/19 Unknown History














ED Physical Exam





- General


Limitations: Language Barrier





- Head


Head exam: Present: atraumatic, normocephalic





- Eye


Eye exam: Present: normal appearance, PERRL, EOMI


Pupils: Present: normal accommodation





- ENT


ENT exam: Present: mucous membranes moist





- Neck


Neck exam: Present: normal inspection, full ROM.  Absent: tenderness





- Respiratory


Respiratory exam: Present: normal lung sounds bilaterally.  Absent: respiratory 

distress, wheezes, stridor, chest wall tenderness





- Cardiovascular


Cardiovascular Exam: Present: regular rate, normal rhythm, normal heart sounds. 

 Absent: systolic murmur, diastolic murmur, rubs, gallop





- GI/Abdominal


GI/Abdominal exam: Present: soft, tenderness (bilat lower abd ), normal bowel s

ounds.  Absent: guarding, rebound, rigid, bruit, hernia





- Rectal


Rectal exam: Present: deferred





- Extremities Exam


Extremities exam: Present: normal inspection, full ROM, normal capillary refill.

  Absent: tenderness, pedal edema





- Back Exam


Back exam: Present: normal inspection, full ROM.  Absent: tenderness, CVA 

tenderness (R), CVA tenderness (L), vertebral tenderness, rash noted





- Neurological Exam


Neurological exam: Present: alert, oriented X3, CN II-XII intact, normal gait





- Psychiatric


Psychiatric exam: Present: normal affect, normal mood





- Skin


Skin exam: Present: warm, dry, intact, normal color.  Absent: rash





ED Course





                                   Vital Signs











  11/10/19 11/10/19





  19:09 20:39


 


Temperature 98.1 F 98.1 F


 


Pulse Rate 78 71


 


Respiratory 18 16





Rate  


 


Blood Pressure 143/84 


 


Blood Pressure  167/91





[Left]  


 


O2 Sat by Pulse 96 96





Oximetry  














ED Medical Decision Making





- Lab Data


Result diagrams: 


                                 11/10/19 19:34





                                 11/10/19 20:43





Labs











  11/10/19 11/10/19 11/10/19





  19:34 19:34 20:43


 


WBC  4.4 L  


 


RBC  4.53  


 


Hgb  14.8  


 


Hct  42.6  


 


MCV  94  


 


MCH  33 H  


 


MCHC  35 H  


 


RDW  14.0  


 


Plt Count  189  


 


Sodium   122 L 


 


Potassium   3.9 


 


Chloride   86.3 L 


 


Carbon Dioxide   21 L 


 


Anion Gap   19 


 


BUN   11 


 


Creatinine   0.5 L 


 


Estimated GFR   > 60 


 


BUN/Creatinine Ratio   22 


 


Glucose   94 


 


Calcium   8.8 


 


Total Bilirubin   0.60 


 


Direct Bilirubin   < 0.2 


 


Indirect Bilirubin   0.4 


 


AST   21 


 


ALT   19 


 


Alkaline Phosphatase   62 


 


Troponin T   < 0.010  < 0.010


 


Total Protein   7.2 


 


Albumin   4.6 


 


Albumin/Globulin Ratio   1.8 


 


Lipase   70 H 


 


Urine Color   


 


Urine Turbidity   


 


Urine pH   


 


Ur Specific Gravity   


 


Urine Protein   


 


Urine Glucose (UA)   


 


Urine Ketones   


 


Urine Blood   


 


Urine Nitrite   


 


Urine Bilirubin   


 


Urine Urobilinogen   


 


Ur Leukocyte Esterase   


 


Urine WBC (Auto)   


 


Urine RBC (Auto)   


 


Calcium Oxalate Crystal   


 


Urine Mucus   














  11/10/19 11/10/19





  20:43 Unknown


 


WBC  


 


RBC  


 


Hgb  


 


Hct  


 


MCV  


 


MCH  


 


MCHC  


 


RDW  


 


Plt Count  


 


Sodium  121 L 


 


Potassium  3.8 


 


Chloride  88.3 L 


 


Carbon Dioxide  19 L 


 


Anion Gap  18 


 


BUN  11 


 


Creatinine  0.5 L 


 


Estimated GFR  > 60 


 


BUN/Creatinine Ratio  22 


 


Glucose  91 


 


Calcium  8.4 


 


Total Bilirubin  0.50 


 


Direct Bilirubin  


 


Indirect Bilirubin  


 


AST  24 


 


ALT  17 


 


Alkaline Phosphatase  52 


 


Troponin T  


 


Total Protein  6.4 


 


Albumin  3.9 


 


Albumin/Globulin Ratio  1.6 


 


Lipase  


 


Urine Color   Yellow


 


Urine Turbidity   Slightly-cloudy


 


Urine pH   7.0


 


Ur Specific Gravity   1.013


 


Urine Protein   <15 mg/dl


 


Urine Glucose (UA)   Neg


 


Urine Ketones   20


 


Urine Blood   Neg


 


Urine Nitrite   Neg


 


Urine Bilirubin   Neg


 


Urine Urobilinogen   2.0


 


Ur Leukocyte Esterase   Neg


 


Urine WBC (Auto)   < 1.0


 


Urine RBC (Auto)   < 1.0


 


Calcium Oxalate Crystal   3+


 


Urine Mucus   Few

















- EKG Data


EKG shows normal: sinus rhythm


Rate: normal





- EKG Data


Interpretation: normal EKG





- Radiology Data


Radiology results: report reviewed, image reviewed





- Medical Decision Making


 cxr : normal no infiltrate no opacities, ct abd pelv:  multiple bladder stones,

1 3mm renal stone no obstructive, ua: normal, Na: 121, k: 3.4, ivf bolus, ns iv,

monitor electrolytes, ekg: NSR, trop: ,0.01 plan admit to hospitalist for 

hyponatremia , consulted hospitalist ,recommendation admit to inpatient, dx: 

hyponatremia, pt care hand off given in hospitalist at this time. 





Critical care attestation.: 


If time is entered above; I have spent that time in minutes in the direct care 

of this critically ill patient, excluding procedure time.








ED Disposition


Clinical Impression: 


 Hyponatremia, Bladder stones, Renal stones





Disposition:  OP ADMIT IP TO THIS HOSP


Is pt being admited?: No


Does the pt Need Aspirin: No


Condition: Stable

## 2019-11-10 NOTE — EVENT NOTE
ED Screening Note


Date of service: 11/10/19


Time: 18:59


ED Screening Note: 








This initial assessment/diagnostic orders/clinical plan/treatment(s) is/are 

subject to change based on patients health status, clinical progression and re-

assessment by fellow clinical providers in the ED. Further treatment and workup 

at subsequent clinical providers discretion. Patient/guardian urged not to elope

from the ED as their condition may be serious if not clinically assessed and 

managed. 





Initial orders include:

## 2019-11-10 NOTE — XRAY REPORT
CHEST 2 VIEWS 



INDICATION / CLINICAL INFORMATION:

chest pain.



COMPARISON: 

None available.



FINDINGS:



SUPPORT DEVICES: None.



HEART / MEDIASTINUM: No significant abnormality. 



LUNGS / PLEURA: No significant pulmonary or pleural abnormality. No pneumothorax. 



ADDITIONAL FINDINGS: No significant additional findings.



IMPRESSION:

1. No acute findings.



Signer Name: Reinaldo Elam MD 

Signed: 11/10/2019 9:47 PM

 Workstation Name: [id number]

## 2019-11-10 NOTE — EVENT NOTE
ED Screening Note


Date of service: 11/10/19


Time: 19:09


ED Screening Note: 





Pt complains of epigastric pain, unable to sleep x 1 month, and sour mouth


seen here 10/20/19





This initial assessment/diagnostic orders/clinical plan/treatment(s) is/are 

subject to change based on patients health status, clinical progression and re-

assessment by fellow clinical providers in the ED. Further treatment and workup 

at subsequent clinical providers discretion. Patient/guardian urged not to elope

from the ED as their condition may be serious if not clinically assessed and 

managed. 





Initial orders include:


labs

## 2019-11-10 NOTE — HISTORY AND PHYSICAL REPORT
History of Present Illness


Date of examination: 11/10/19


History of present illness: 


History is via the .  Is a 73-year-old man with a history of 

hypertension, diabetes, hyperlipidemia, depression, PTSD comes emergency room 

because he noted that his blood pressure was high.  He stated that he is 

worried, lives alone and he has difficulty sleeping for the last 1 month.  He 

checked his pressures today and was elevated so he came to the emergency room 

for further evaluation.  He is on a diuretic, he does not recall the name


Review Of  Systems:


Constitutional: no weight loss, fever, chills


Ears, eyes, nose, mouth and throat: no nasal congestion, no nasal discharge, no 

sinus pressure, blurry vision, diplopia


Neck: No neck pain or rigidity.


Cardiovascular: No  palpitations, chest pain


Respiratory: No shortness of breath, cough


Gastrointestinal: No hematochezia, abdominal pain


Genitourinary : no dysuria, frequency


Musculoskeletal: no muscle ache , joint pain


Integumentary: no rash, no pruritis


Neurological: no parathesias, focal weakness


Endocrine: no cold or heat intolerance, no polyuria or polydipsia


Hematologic/Lymphatic: no easy bruising, no easy bleeding, no gland swelling


Allergic/Immunologic: no urticaria, no angioedema.





PAST MEDICAL HISTORY:hypertension, diabetes, hyperlipidemia, depression, PTSD





PAST SURGICAL HISTORY: None





FAMILY HISTORY:hypertension, diabetes





SOCIAL HISTORY: Denies  tobacco, drugs,  alcohol








Medications and Allergies


                                    Allergies











Allergy/AdvReac Type Severity Reaction Status Date / Time


 


No Known Allergies Allergy   Verified 10/20/19 09:29











                                Home Medications











 Medication  Instructions  Recorded  Confirmed  Last Taken  Type


 


Metoprolol [Lopressor TAB] 25 mg PO DAILY 12/21/18 11/10/19 Unknown History


 


Ranitidine HCl [Acid Reducer] 150 mg PO BID 12/21/18 11/10/19 Unknown History


 


metFORMIN XR [Glucophage XR] 500 mg PO QDDIAB #60 tablet 12/22/18 11/10/19 

Unknown Rx


 


Aspirin 325 mg PO QDAY #30 tablet 10/22/19 11/10/19 Unknown Rx


 


AtorvaSTATin [Lipitor] 40 mg PO QHS #30 tablet 10/22/19 11/10/19 Unknown Rx


 


Citalopram [celeXA] 20 mg PO QDAY 11/10/19 11/10/19 Unknown History


 


Tamsulosin [Flomax] 0.4 mg PO QDAY 11/10/19 11/10/19 Unknown History











Active Meds: 


Active Medications





Sodium Chloride (Nacl 0.9% 1000 Ml)  1,000 mls @ 250 mls/hr IV ONCE ONE


   Stop: 11/11/19 02:10











Exam





- Physical Exam


Narrative exam: 


General Apperance: The patient sitting in bed no acute distress


HEENT: Normocephalic, atraumatic.  Pupils equally round and reactive to light, 

extraocular movement intact, and no sclericterus or JVD or thyromegaly or 

nodule.  Neck supple, no carotid bruit, mucous membranes moist, no exudate or 

erythema


Heart: S1-S2, regular is rhythm


Lungs: Clear to auscultation bilaterally, breathing comfortable


Abdomen: Positive bowel sounds, soft, nontender, nondistended, no organomegaly


Extremities: No edema cyanosis clubbing


Skin: no rash, nodule, warm and dry


Neuro:CN 2 -12 intact, motor/sensory intact, speech is fluent








- Constitutional


Vitals: 


                                        











Temp Pulse Resp BP Pulse Ox


 


 98.1 F   66   13   154/86   98 


 


 11/10/19 20:39  11/10/19 22:00  11/10/19 22:00  11/10/19 22:00  11/10/19 22:00














Results





- Labs


CBC & Chem 7: 


                                 11/10/19 19:34





                                 11/10/19 20:43


Labs: 


                              Abnormal lab results











  11/10/19 11/10/19 11/10/19 Range/Units





  19:34 19:34 20:43 


 


WBC  4.4 L    (4.5-11.0)  K/mm3


 


MCH  33 H    (28-32)  pg


 


MCHC  35 H    (32-34)  %


 


Sodium   122 L  121 L  (137-145)  mmol/L


 


Chloride   86.3 L  88.3 L  ()  mmol/L


 


Carbon Dioxide   21 L  19 L  (22-30)  mmol/L


 


Creatinine   0.5 L  0.5 L  (0.8-1.5)  mg/dL


 


Lipase   70 H   (13-60)  units/L














- Imaging and Cardiology


Chest x-ray: report reviewed


CT scan - abdomen: report reviewed


CT scan - pelvis: report reviewed





Assessment and Plan


Assessment


Hyponatremia secondary to diuretic


hypertension


diabetes


 hyperlipidemia


 depression


PTSD





Plan


Admit to medicine


Surgery fluid, consult renal


Check fingersticks initiate insulin sliding scale


Continued appropriate outpatient medication


DVT prophylaxis

## 2019-11-11 LAB
BASOPHILS # (AUTO): 0 K/MM3 (ref 0–0.1)
BASOPHILS NFR BLD AUTO: 1.2 % (ref 0–1.8)
BENZODIAZEPINES SCREEN,URINE: (no result)
BUN SERPL-MCNC: 12 MG/DL (ref 9–20)
BUN SERPL-MCNC: 17 MG/DL (ref 9–20)
BUN SERPL-MCNC: 8 MG/DL (ref 9–20)
BUN/CREAT SERPL: 20 %
BUN/CREAT SERPL: 24 %
BUN/CREAT SERPL: 34 %
CALCIUM SERPL-MCNC: 7.8 MG/DL (ref 8.4–10.2)
CALCIUM SERPL-MCNC: 8.2 MG/DL (ref 8.4–10.2)
CALCIUM SERPL-MCNC: 8.5 MG/DL (ref 8.4–10.2)
EOSINOPHIL # BLD AUTO: 0 K/MM3 (ref 0–0.4)
EOSINOPHIL NFR BLD AUTO: 0.9 % (ref 0–4.3)
HCT VFR BLD CALC: 41.2 % (ref 35.5–45.6)
HEMOLYSIS INDEX: 12
HEMOLYSIS INDEX: 3
HEMOLYSIS INDEX: 7
HGB BLD-MCNC: 14.2 GM/DL (ref 11.8–15.2)
LYMPHOCYTES # BLD AUTO: 0.5 K/MM3 (ref 1.2–5.4)
LYMPHOCYTES NFR BLD AUTO: 15.2 % (ref 13.4–35)
MCHC RBC AUTO-ENTMCNC: 35 % (ref 32–34)
MCV RBC AUTO: 95 FL (ref 84–94)
METHADONE SCREEN,URINE: (no result)
MONOCYTES # (AUTO): 0.4 K/MM3 (ref 0–0.8)
MONOCYTES % (AUTO): 11.2 % (ref 0–7.3)
OPIATE SCREEN,URINE: (no result)
PLATELET # BLD: 157 K/MM3 (ref 140–440)
RBC # BLD AUTO: 4.34 M/MM3 (ref 3.65–5.03)

## 2019-11-11 RX ADMIN — ENOXAPARIN SODIUM SCH MG: 100 INJECTION SUBCUTANEOUS at 10:39

## 2019-11-11 RX ADMIN — Medication SCH ML: at 22:34

## 2019-11-11 RX ADMIN — INSULIN LISPRO SCH: 100 INJECTION, SOLUTION INTRAVENOUS; SUBCUTANEOUS at 22:34

## 2019-11-11 RX ADMIN — INSULIN LISPRO SCH: 100 INJECTION, SOLUTION INTRAVENOUS; SUBCUTANEOUS at 10:37

## 2019-11-11 RX ADMIN — INSULIN LISPRO SCH: 100 INJECTION, SOLUTION INTRAVENOUS; SUBCUTANEOUS at 18:00

## 2019-11-11 RX ADMIN — Medication SCH ML: at 10:39

## 2019-11-11 RX ADMIN — INSULIN LISPRO SCH UNIT: 100 INJECTION, SOLUTION INTRAVENOUS; SUBCUTANEOUS at 13:10

## 2019-11-11 NOTE — CONSULTATION
History of Present Illness





- Reason for Consult


Consult date: 11/11/19


hyponatremia





- History of Present Illness





History is via .  This is a 73-year-old man with a history of 

hypertension, diabetes, hyperlipidemia, depression, PTSD who presents with high 

blood pressure, found to have hyponatremia to 122.  He is not aware of what 

medications he takes, including diuretic use.  He denies any issues this AM.





Past History


Past Medical History: diabetes, hypertension, hyperlipidemia


Past Surgical History: No surgical history


Social history: no significant social history


Family history: no significant family history





Medications and Allergies


                                    Allergies











Allergy/AdvReac Type Severity Reaction Status Date / Time


 


No Known Allergies Allergy   Verified 10/20/19 09:29











                                Home Medications











 Medication  Instructions  Recorded  Confirmed  Last Taken  Type


 


Metoprolol [Lopressor TAB] 25 mg PO DAILY 12/21/18 11/10/19 Unknown History


 


Ranitidine HCl [Acid Reducer] 150 mg PO BID 12/21/18 11/10/19 Unknown History


 


metFORMIN XR [Glucophage XR] 500 mg PO QDDIAB #60 tablet 12/22/18 11/10/19 

Unknown Rx


 


Aspirin 325 mg PO QDAY #30 tablet 10/22/19 11/10/19 Unknown Rx


 


AtorvaSTATin [Lipitor] 40 mg PO QHS #30 tablet 10/22/19 11/10/19 Unknown Rx


 


Citalopram [celeXA] 20 mg PO QDAY 11/10/19 11/10/19 Unknown History


 


Tamsulosin [Flomax] 0.4 mg PO QDAY 11/10/19 11/10/19 Unknown History











Active Meds: 


Active Medications





Acetaminophen (Tylenol)  650 mg PO Q4H PRN


   PRN Reason: Pain MILD(1-3)/Fever >100.5/HA


Dextrose (D50w (25gm) Syringe)  50 ml IV Q30MIN PRN; Protocol


   PRN Reason: Hypoglycemia


Enoxaparin Sodium (Enoxaparin)  30 mg SUB-Q QDAY BONNIE


Hydralazine HCl (Apresoline)  5 mg IV Q6H PRN


   PRN Reason: Hypertension


Sodium Chloride (Nacl 0.9% 1000 Ml)  1,000 mls @ 75 mls/hr IV AS DIRECT BONNIE


   Last Admin: 11/11/19 05:04 Dose:  75 mls/hr


   Documented by: 


Insulin Human Lispro (Humalog)  0 unit SUB-Q ACHS BONNIE; Protocol


Ondansetron HCl (Zofran)  4 mg IV Q8H PRN


   PRN Reason: Nausea And Vomiting


Sodium Chloride (Sodium Chloride Flush Syringe 10 Ml)  10 ml IV BID BONNIE


Sodium Chloride (Sodium Chloride Flush Syringe 10 Ml)  10 ml IV PRN PRN


   PRN Reason: LINE FLUSH











Review of Systems


All systems: negative (per HPI)





Exam





- Vital Signs


Vital signs: 


                                   Vital Signs











Temp Pulse Resp BP Pulse Ox


 


 98.1 F   78   18   143/84   96 


 


 11/10/19 19:09  11/10/19 19:09  11/10/19 19:09  11/10/19 19:09  11/10/19 19:09














- General Appearance


General appearance: well-developed, well-nourished, appears stated age


EENT: PERRL, mucous membranes moist


Neck: Present: neck supple, trachea midline.  Absent: JVD/HJR, Masses


Respiratory: Clear to Ascultation


Heart: regular, normal heart rate, S1S2, no murmurs


Gastrointestinal: Present: normal.  Absent: tenderness, distended, masses, 

guarding


Integumentary: no rash, warm and dry


Neurologic: no focal deficit, alert and oriented x3, gait normal, strength 5/5


Musculoskeletal: Absent: deformities, joint swelling


Psychiatric: mood/affect appropriate, cooperative





Results





- Lab Results





                                 11/11/19 05:56





                                 11/11/19 10:53


                             Most recent lab results











Calcium  7.8 mg/dL (8.4-10.2)  L  11/11/19  05:56    














Assessment and Plan





This is a 73 year old man with HTN who presents with hyponatremia.





# Hyponatremia: suspect hypovolemic, hypotonic hyponatremia due to diuretic use 

(although unclear what he is taking).  Sodium 122->129 as of this AM with IVF.  

Possible that he has SIADH in setting of citalopram use as well; likely a 

combination of these factors.  


- would not overcorrect sodium to more than 130 after 24 hours given unclear 

duration of hyponatremia; have stopped IVF for now (s/p >2L IVF)


- recheck sodium in evening; if sodium downtrending, can restart gentle IVF


- hold diuretics (specifically thiazide diuretics) for now


- ok to replete K prn





# Calcium oxalate stones: seen on UA and stones noted on CT abd/pelvis; no acute

 symptoms noted


- if asymptomatic, can follow up outpatient for litholink and further workup


- no role for citrate given alkaline urine





# Gap acidosis: unclear etiology given normal renal function; denies any 

intoxication but will send toxicology screen.  No sodium bicarbonate at this 

time as may lower potassium.

## 2019-11-11 NOTE — PROGRESS NOTE
Assessment and Plan


Assessment and plan: 


Very pleasant 73-year-old Greek male patient with significant past medical 

history of hypertension diabetes mellitus 


dyslipidemia depression PTSD was admitted through emergency room with 

uncontrolled blood pressures and severe hyponatremia


Evaluated by nephrology, medications optimized





--Severe hyponatremia; probably due to hypovolemic


IV normal saline, closely monitor electrolytes and adjust as needed





--Hypokalemia -replaced per protocol and monitor levels





--Calcium oxalate stones: seen on UA and stones noted on CT abd/pelvis; 


asymptomatic, can follow up outpatient urology 





--Metabolic acidosis: unclear etiology given normal renal function; symmetrical 

closely monitor





--Malnutrition; BMI 17.7


Nutrition supplements and supportive care





--Hypertension; moderate control


Continue current antihypertensives and when necessary medications





--Dyslipidemia; lipid-lowering medications low-cholesterol diet





--Type 2 diabetes mellitus; Accu-Chek sliding scale coverage ADA diet


Insulin as needed





--History of depression; no suicidal thoughts or ideation


Continue psych medications





--History of PTSD; supportive care and continue current management





--DVT prophylaxis Lovenox





Monitor closely and adjust the management as needed


Disposition; DC home when medically stable





History


Interval history: 


Patient Seen and examined medical records reviewed


Patient complains of generalized weakness


No new complaints


Vital signs noted








Hospitalist Physical





- Constitutional


Vitals: 


                                        











Temp Pulse Resp BP Pulse Ox


 


 98.0 F   81   18   146/81   98 


 


 11/11/19 08:43  11/11/19 08:43  11/11/19 12:37  11/11/19 08:43  11/11/19 14:16











General appearance: Present: no acute distress, well-nourished





- EENT


Eyes: Present: PERRL, EOM intact





- Neck


Neck: Present: supple, normal ROM





- Respiratory


Respiratory effort: normal


Respiratory: bilateral: diminished, negative: rales, rhonchi, wheezing





- Cardiovascular


Rhythm: regular


Heart Sounds: Present: S1 & S2





- Extremities


Extremities: no ischemia, No edema





- Abdominal


General gastrointestinal: soft, non-tender, non-distended, normal bowel sounds





- Integumentary


Integumentary: Present: clear, warm





- Psychiatric


Psychiatric: appropriate mood/affect, cooperative





- Neurologic


Neurologic: CNII-XII intact, moves all extremities





Results





- Labs


CBC & Chem 7: 


                                 11/11/19 05:56





                                 11/11/19 19:34


Labs: 


                             Laboratory Last Values











WBC  3.5 K/mm3 (4.5-11.0)  L  11/11/19  05:56    


 


RBC  4.34 M/mm3 (3.65-5.03)   11/11/19  05:56    


 


Hgb  14.2 gm/dl (11.8-15.2)   11/11/19  05:56    


 


Hct  41.2 % (35.5-45.6)   11/11/19  05:56    


 


MCV  95 fl (84-94)  H  11/11/19  05:56    


 


MCH  33 pg (28-32)  H  11/11/19  05:56    


 


MCHC  35 % (32-34)  H  11/11/19  05:56    


 


RDW  14.1 % (13.2-15.2)   11/11/19  05:56    


 


Plt Count  157 K/mm3 (140-440)   11/11/19  05:56    


 


Lymph % (Auto)  15.2 % (13.4-35.0)   11/11/19  05:56    


 


Mono % (Auto)  11.2 % (0.0-7.3)  H  11/11/19  05:56    


 


Eos % (Auto)  0.9 % (0.0-4.3)   11/11/19  05:56    


 


Baso % (Auto)  1.2 % (0.0-1.8)   11/11/19  05:56    


 


Lymph #  0.5 K/mm3 (1.2-5.4)  L  11/11/19  05:56    


 


Mono #  0.4 K/mm3 (0.0-0.8)   11/11/19  05:56    


 


Eos #  0.0 K/mm3 (0.0-0.4)   11/11/19  05:56    


 


Baso #  0.0 K/mm3 (0.0-0.1)   11/11/19  05:56    


 


Seg Neutrophils %  71.5 % (40.0-70.0)  H  11/11/19  05:56    


 


Seg Neutrophils #  2.5 K/mm3 (1.8-7.7)   11/11/19  05:56    


 


Sodium  128 mmol/L (137-145)  L  11/11/19  10:53    


 


Potassium  3.5 mmol/L (3.6-5.0)  L  11/11/19  10:53    


 


Chloride  93.0 mmol/L ()  L  11/11/19  10:53    


 


Carbon Dioxide  20 mmol/L (22-30)  L  11/11/19  10:53    


 


Anion Gap  19 mmol/L  11/11/19  10:53    


 


BUN  12 mg/dL (9-20)   11/11/19  10:53    


 


Creatinine  0.5 mg/dL (0.8-1.5)  L  11/11/19  10:53    


 


Estimated GFR  > 60 ml/min  11/11/19  10:53    


 


BUN/Creatinine Ratio  24 %  11/11/19  10:53    


 


Glucose  225 mg/dL ()  H  11/11/19  10:53    


 


POC Glucose  155  ()  H  11/11/19  12:25    


 


Osmolality  269 Mosm/kg  11/11/19  10:53    


 


Calcium  8.2 mg/dL (8.4-10.2)  L  11/11/19  10:53    


 


Total Bilirubin  0.50 mg/dL (0.1-1.2)   11/10/19  20:43    


 


Direct Bilirubin  < 0.2 mg/dL (0-0.2)   11/10/19  19:34    


 


Indirect Bilirubin  0.4 mg/dL  11/10/19  19:34    


 


AST  24 units/L (5-40)   11/10/19  20:43    


 


ALT  17 units/L (7-56)   11/10/19  20:43    


 


Alkaline Phosphatase  52 units/L ()   11/10/19  20:43    


 


Troponin T  < 0.010 ng/mL (0.00-0.029)   11/10/19  20:43    


 


Total Protein  6.4 g/dL (6.3-8.2)   11/10/19  20:43    


 


Albumin  3.9 g/dL (3.9-5)   11/10/19  20:43    


 


Albumin/Globulin Ratio  1.6 %  11/10/19  20:43    


 


Lipase  70 units/L (13-60)  H  11/10/19  19:34    


 


Urine Color  Yellow  (Yellow)   11/10/19  Unknown


 


Urine Turbidity  Slightly-cloudy  (Clear)   11/10/19  Unknown


 


Urine pH  7.0  (5.0-7.0)   11/10/19  Unknown


 


Ur Specific Gravity  1.013  (1.003-1.030)   11/10/19  Unknown


 


Urine Protein  <15 mg/dl mg/dL (Negative)   11/10/19  Unknown


 


Urine Glucose (UA)  Neg mg/dL (Negative)   11/10/19  Unknown


 


Urine Ketones  20 mg/dL (Negative)   11/10/19  Unknown


 


Urine Blood  Neg  (Negative)   11/10/19  Unknown


 


Urine Nitrite  Neg  (Negative)   11/10/19  Unknown


 


Urine Bilirubin  Neg  (Negative)   11/10/19  Unknown


 


Urine Urobilinogen  2.0 mg/dL (<2.0)   11/10/19  Unknown


 


Ur Leukocyte Esterase  Neg  (Negative)   11/10/19  Unknown


 


Urine WBC (Auto)  < 1.0 /HPF (0.0-6.0)   11/10/19  Unknown


 


Urine RBC (Auto)  < 1.0 /HPF (0.0-6.0)   11/10/19  Unknown


 


Calcium Oxalate Crystal  3+   11/10/19  Unknown


 


Urine Mucus  Few /HPF  11/10/19  Unknown














Active Medications





- Current Medications


Current Medications: 














Generic Name Dose Route Start Last Admin





  Trade Name Freq  PRN Reason Stop Dose Admin


 


Acetaminophen  650 mg  11/10/19 23:13  11/11/19 13:10





  Tylenol  PO   650 mg





  Q4H PRN   Administration





  Pain MILD(1-3)/Fever >100.5/HA  


 


Dextrose  50 ml  11/11/19 02:34 





  D50w (25gm) Syringe  IV  





  Q30MIN PRN  





  Hypoglycemia  





  Protocol  


 


Enoxaparin Sodium  30 mg  11/11/19 10:00  11/11/19 10:39





  Enoxaparin  SUB-Q   30 mg





  QDAY BONNIE   Administration


 


Hydralazine HCl  5 mg  11/11/19 01:10 





  Apresoline  IV  





  Q6H PRN  





  Hypertension  


 


Insulin Human Lispro  0 unit  11/11/19 07:30  11/11/19 13:10





  Humalog  SUB-Q   3 unit





  ACHS BONNIE   Administration





  Protocol  


 


Ondansetron HCl  4 mg  11/10/19 23:13 





  Zofran  IV  





  Q8H PRN  





  Nausea And Vomiting  


 


Sodium Chloride  10 ml  11/11/19 10:00  11/11/19 10:39





  Sodium Chloride Flush Syringe 10 Ml  IV   10 ml





  BID BONNIE   Administration


 


Sodium Chloride  10 ml  11/10/19 23:13 





  Sodium Chloride Flush Syringe 10 Ml  IV  





  PRN PRN  





  LINE FLUSH  














Nutrition/Malnutrition Assess





- Dietary Evaluation


Nutrition/Malnutrition Findings: 


                                        





Nutrition Notes                                            Start:  11/11/19 

15:16


Freq:                                                      Status: Active       




Protocol:                                                                       




 Document     11/11/19 15:16  RM  (Rec: 11/11/19 15:26  RM  [id number])


 Nutrition Notes


     Need for Assessment generated from:         RN Referral


     Initial or Follow up                        Assessment


     Current Diagnosis                           Diabetes,Hypertension,


                                                 Hyperlipidemia


     Other Pertinent Diagnosis                   Depression, PTSD


     Current Diet                                Cardiac/Consistent CHO


     Labs/Tests                                  Reviewed


     Pertinent Medications                       Reviewed


     Height                                      5 ft 2 in


     Weight                                      43.9 kg


     Ideal Body Weight (kg)                      53.63


     BMI                                         17.6


     Subjective/Other Information                Screened for low BMI.


                                                 Pt and pt caregiver in room at


                                                 time of visit. Pt speaks only


                                                 Sami so caregiver acted


                                                 as . Stated that


                                                 PTA pt ate 2 small meals daily


                                                 d/t a bitter taste in his


                                                 mouth. Stated that pt eats


                                                 most of his meals here. Pt


                                                 agreed to trial of Glucerna.


                                                 Stated UBW was 97 lbs but


                                                 unsure of timeframe.


                                                 Noted moderate temporal and


                                                 slight clavicle wasting.


     Burn                                        Absent


     Trauma                                      Absent


     Minimum of two criteria                     Yes


     Energy Intake (non-severe)                  <75% Estimated Energy


                                                 Requirement >7 days


     Muscle Mass                                 Moderate Depletion (severe)


     #1


      Nutrition Diagnosis                        Malnutrition


      Etiology                                   decreased appetite, bitter


                                                 taste in mouth


      As Evidenced by Signs and Symptoms         moderate temporal wasting,


                                                 slight clavicle wasting, BMI


                                                 17.8, pt statement that PTA he


                                                 ate 2 small meals daily


     Is patient on ventilator?                   No


     Is Patient Ambulatory and/or Out of Bed     Yes


     REE-([facility]-ambulatory/OOB) [     [phone]


      [email or web address]]                               


     Kcal/Kg value to use for calculation        43


     Approximate Energy Requirements Using       1888


      kcal/Kg                                    


     Calculation Used for Recommendations        Kcal/kg


     Additional Notes                            Protein Needs: 53-66g (1.2-1.


                                                 5g/kg)


                                                 Fluid Needs: 1 ml/kcal


 Nutrition Intervention


     Change Diet Order:                          continue current


     Add Supplement/Snack (indicate name/kcal    Glucerna Vanilla 1 daily


      /protein )                                 


     Provides kCal:                              220


     Provides Protein (gm)                       10


     Goal #1                                     Meet at least 75% of calorie


                                                 and protein needs


     Goal #2                                     Wt gain/maintenance


     Anticipated Discharge Needs:                Cardiac/Consistent CHO diet


     Follow-Up By:                               11/13/19


     Additional Comments                         Follow for PO and ONS intakes

## 2019-11-12 LAB
BUN SERPL-MCNC: 13 MG/DL (ref 9–20)
BUN SERPL-MCNC: 16 MG/DL (ref 9–20)
BUN/CREAT SERPL: 27 %
BUN/CREAT SERPL: 33 %
CALCIUM SERPL-MCNC: 8.2 MG/DL (ref 8.4–10.2)
CALCIUM SERPL-MCNC: 8.8 MG/DL (ref 8.4–10.2)
HEMOLYSIS INDEX: 25
HEMOLYSIS INDEX: 7

## 2019-11-12 RX ADMIN — INSULIN LISPRO SCH: 100 INJECTION, SOLUTION INTRAVENOUS; SUBCUTANEOUS at 22:09

## 2019-11-12 RX ADMIN — INSULIN LISPRO SCH: 100 INJECTION, SOLUTION INTRAVENOUS; SUBCUTANEOUS at 08:40

## 2019-11-12 RX ADMIN — INSULIN LISPRO SCH: 100 INJECTION, SOLUTION INTRAVENOUS; SUBCUTANEOUS at 16:33

## 2019-11-12 RX ADMIN — Medication SCH ML: at 21:09

## 2019-11-12 RX ADMIN — Medication SCH ML: at 10:13

## 2019-11-12 RX ADMIN — ENOXAPARIN SODIUM SCH MG: 100 INJECTION SUBCUTANEOUS at 10:13

## 2019-11-12 RX ADMIN — INSULIN LISPRO SCH: 100 INJECTION, SOLUTION INTRAVENOUS; SUBCUTANEOUS at 12:34

## 2019-11-12 NOTE — PROGRESS NOTE
Assessment and Plan


Assessment and plan: 


73-year-old male patient with significant past medical history of hypertension 

diabetes mellitus 


dyslipidemia depression PTSD was admitted through emergency room with 

uncontrolled blood pressures and severe hyponatremia


Evaluated by nephrology, medications optimized





--Severe hyponatremia; probably due to hypovolemic


IV normal saline, closely monitor electrolytes and adjust as needed


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 

citalopram use. Aboved overcorrection. 





--Hypokalemia -replaced per protocol and monitor levels





--Calcium oxalate stones: seen on UA and stones noted on CT abd/pelvis; 


asymptomatic, can follow up outpatient urology 





--Metabolic acidosis: unclear etiology given normal renal function; symmetrical 

closely monitor





--Severe protein calorie Malnutrition; BMI 17.7


Nutrition supplements and supportive care





--Hypertension; moderate control


Continue current antihypertensives and when necessary medications





--Dyslipidemia; lipid-lowering medications low-cholesterol diet





--Type 2 diabetes mellitus; Accu-Chek sliding scale coverage ADA diet


Insulin as needed





--History of depression; no suicidal thoughts or ideation


Continue psych medications





--History of PTSD; supportive care and continue current management





--DVT prophylaxis Lovenox





Monitor closely and adjust the management as needed


Disposition; DC home when medically stable


Anticiopate discharge in am














History


Interval history: 


Patient seen and examined, reports bilateral lower ext tremors. No other 

complaints at this time








Hospitalist Physical





- Physical exam


Narrative exam: 


General appearance: Present: no acute distress, well-nourished





- EENT


Eyes: Present: PERRL, EOM intact





- Neck


Neck: Present: supple, normal ROM





- Respiratory


Respiratory effort: normal


Respiratory: bilateral: diminished, negative: rales, rhonchi, wheezing





- Cardiovascular


Rhythm: regular


Heart Sounds: Present: S1 & S2





- Extremities


Extremities: no ischemia, No edema





- Abdominal


General gastrointestinal: soft, non-tender, non-distended, normal bowel sounds





- Integumentary


Integumentary: Present: clear, warm





- Psychiatric


Psychiatric: appropriate mood/affect, cooperative





- Neurologic


Neurologic: CNII-XII intact, moves all extremities, bilateral lower ext tremor








- Constitutional


Vitals: 


                                        











Temp Pulse Resp BP Pulse Ox


 


 97.4 F L  81   19   145/87   96 


 


 11/12/19 04:22  11/12/19 11:59  11/12/19 10:00  11/12/19 11:59  11/12/19 11:59











General appearance: Present: no acute distress, well-nourished





Results





- Labs


CBC & Chem 7: 


                                 11/11/19 05:56





                                 11/12/19 14:21


Labs: 


                             Laboratory Last Values











WBC  3.5 K/mm3 (4.5-11.0)  L  11/11/19  05:56    


 


RBC  4.34 M/mm3 (3.65-5.03)   11/11/19  05:56    


 


Hgb  14.2 gm/dl (11.8-15.2)   11/11/19  05:56    


 


Hct  41.2 % (35.5-45.6)   11/11/19  05:56    


 


MCV  95 fl (84-94)  H  11/11/19  05:56    


 


MCH  33 pg (28-32)  H  11/11/19  05:56    


 


MCHC  35 % (32-34)  H  11/11/19  05:56    


 


RDW  14.1 % (13.2-15.2)   11/11/19  05:56    


 


Plt Count  157 K/mm3 (140-440)   11/11/19  05:56    


 


Lymph % (Auto)  15.2 % (13.4-35.0)   11/11/19  05:56    


 


Mono % (Auto)  11.2 % (0.0-7.3)  H  11/11/19  05:56    


 


Eos % (Auto)  0.9 % (0.0-4.3)   11/11/19  05:56    


 


Baso % (Auto)  1.2 % (0.0-1.8)   11/11/19  05:56    


 


Lymph #  0.5 K/mm3 (1.2-5.4)  L  11/11/19  05:56    


 


Mono #  0.4 K/mm3 (0.0-0.8)   11/11/19  05:56    


 


Eos #  0.0 K/mm3 (0.0-0.4)   11/11/19  05:56    


 


Baso #  0.0 K/mm3 (0.0-0.1)   11/11/19  05:56    


 


Seg Neutrophils %  71.5 % (40.0-70.0)  H  11/11/19  05:56    


 


Seg Neutrophils #  2.5 K/mm3 (1.8-7.7)   11/11/19  05:56    


 


Sodium  131 mmol/L (137-145)  L  11/12/19  04:20    


 


Potassium  3.4 mmol/L (3.6-5.0)  L  11/12/19  04:20    


 


Chloride  95.9 mmol/L ()  L  11/12/19  04:20    


 


Carbon Dioxide  21 mmol/L (22-30)  L  11/12/19  04:20    


 


Anion Gap  18 mmol/L  11/12/19  04:20    


 


BUN  13 mg/dL (9-20)   11/12/19  04:20    


 


Creatinine  0.4 mg/dL (0.8-1.5)  L  11/12/19  04:20    


 


Estimated GFR  > 60 ml/min  11/12/19  04:20    


 


BUN/Creatinine Ratio  33 %  11/12/19  04:20    


 


Glucose  97 mg/dL ()   11/12/19  04:20    


 


POC Glucose  123  ()  H  11/12/19  07:32    


 


Osmolality  269 Mosm/kg  11/11/19  10:53    


 


Calcium  8.2 mg/dL (8.4-10.2)  L  11/12/19  04:20    


 


Phosphorus  2.30 mg/dL (2.5-4.5)  L  11/12/19  04:20    


 


Magnesium  2.00 mg/dL (1.7-2.3)   11/12/19  04:20    


 


Total Bilirubin  0.50 mg/dL (0.1-1.2)   11/10/19  20:43    


 


Direct Bilirubin  < 0.2 mg/dL (0-0.2)   11/10/19  19:34    


 


Indirect Bilirubin  0.4 mg/dL  11/10/19  19:34    


 


AST  24 units/L (5-40)   11/10/19  20:43    


 


ALT  17 units/L (7-56)   11/10/19  20:43    


 


Alkaline Phosphatase  52 units/L ()   11/10/19  20:43    


 


Troponin T  < 0.010 ng/mL (0.00-0.029)   11/10/19  20:43    


 


Total Protein  6.4 g/dL (6.3-8.2)   11/10/19  20:43    


 


Albumin  3.9 g/dL (3.9-5)   11/10/19  20:43    


 


Albumin/Globulin Ratio  1.6 %  11/10/19  20:43    


 


Lipase  70 units/L (13-60)  H  11/10/19  19:34    


 


Urine Color  Yellow  (Yellow)   11/10/19  Unknown


 


Urine Turbidity  Slightly-cloudy  (Clear)   11/10/19  Unknown


 


Urine pH  7.0  (5.0-7.0)   11/10/19  Unknown


 


Ur Specific Gravity  1.013  (1.003-1.030)   11/10/19  Unknown


 


Urine Protein  <15 mg/dl mg/dL (Negative)   11/10/19  Unknown


 


Urine Glucose (UA)  Neg mg/dL (Negative)   11/10/19  Unknown


 


Urine Ketones  20 mg/dL (Negative)   11/10/19  Unknown


 


Urine Blood  Neg  (Negative)   11/10/19  Unknown


 


Urine Nitrite  Neg  (Negative)   11/10/19  Unknown


 


Urine Bilirubin  Neg  (Negative)   11/10/19  Unknown


 


Urine Urobilinogen  2.0 mg/dL (<2.0)   11/10/19  Unknown


 


Ur Leukocyte Esterase  Neg  (Negative)   11/10/19  Unknown


 


Urine WBC (Auto)  < 1.0 /HPF (0.0-6.0)   11/10/19  Unknown


 


Urine RBC (Auto)  < 1.0 /HPF (0.0-6.0)   11/10/19  Unknown


 


Calcium Oxalate Crystal  3+   11/10/19  Unknown


 


Urine Mucus  Few /HPF  11/10/19  Unknown


 


Urine Osmolality  519 Mosm/kg  11/11/19  Unknown


 


Urine Sodium  132 mmol/L  11/11/19  Unknown


 


Urine Opiates Screen  Presumptive negative   11/11/19  Unknown


 


Urine Methadone Screen  Presumptive negative   11/11/19  Unknown


 


Ur Barbiturates Screen  Presumptive negative   11/11/19  Unknown


 


Ur Phencyclidine Scrn  Presumptive negative   11/11/19  Unknown


 


Ur Amphetamines Screen  Presumptive negative   11/11/19  Unknown


 


U Benzodiazepines Scrn  Presumptive negative   11/11/19  Unknown


 


Urine Cocaine Screen  Presumptive negative   11/11/19  Unknown


 


U Marijuana (THC) Screen  Presumptive negative   11/11/19  Unknown


 


Drugs of Abuse Note  Disclamer   11/11/19  Unknown














Active Medications





- Current Medications


Current Medications: 














Generic Name Dose Route Start Last Admin





  Trade Name Freq  PRN Reason Stop Dose Admin


 


Acetaminophen  650 mg  11/10/19 23:13  11/11/19 13:10





  Tylenol  PO   650 mg





  Q4H PRN   Administration





  Pain MILD(1-3)/Fever >100.5/HA  


 


Dextrose  50 ml  11/11/19 02:34 





  D50w (25gm) Syringe  IV  





  Q30MIN PRN  





  Hypoglycemia  





  Protocol  


 


Enoxaparin Sodium  30 mg  11/11/19 10:00  11/12/19 10:13





  Enoxaparin  SUB-Q   30 mg





  QDAY BONNIE   Administration


 


Hydralazine HCl  5 mg  11/11/19 01:10 





  Apresoline  IV  





  Q6H PRN  





  Hypertension  


 


Sodium Chloride  1,000 mls @ 75 mls/hr  11/11/19 22:00 





  Nacl 0.9% 1000 Ml  IV  





  AS DIRECT BONNIE  


 


Insulin Human Lispro  0 unit  11/11/19 07:30  11/12/19 12:34





  Humalog  SUB-Q   Not Given





  [facility]  





  Protocol  


 


Ondansetron HCl  4 mg  11/10/19 23:13 





  Zofran  IV  





  Q8H PRN  





  Nausea And Vomiting  


 


Sodium Chloride  10 ml  11/11/19 10:00  11/12/19 10:13





  Sodium Chloride Flush Syringe 10 Ml  IV   10 ml





  BID BONNIE   Administration


 


Sodium Chloride  10 ml  11/10/19 23:13 





  Sodium Chloride Flush Syringe 10 Ml  IV  





  PRN PRN  





  LINE FLUSH  














Nutrition/Malnutrition Assess





- Dietary Evaluation


Nutrition/Malnutrition Findings: 


                                        





Nutrition Notes                                            Start:  11/11/19 

15:16


Freq:                                                      Status: Active       




Protocol:                                                                       




 Document     11/11/19 15:16  RM  (Rec: 11/11/19 15:26  RM  [id number])


 Nutrition Notes


     Need for Assessment generated from:         RN Referral


     Initial or Follow up                        Assessment


     Current Diagnosis                           Diabetes,Hypertension,


                                                 Hyperlipidemia


     Other Pertinent Diagnosis                   Depression, PTSD


     Current Diet                                Cardiac/Consistent CHO


     Labs/Tests                                  Reviewed


     Pertinent Medications                       Reviewed


     Height                                      5 ft 2 in


     Weight                                      43.9 kg


     Ideal Body Weight (kg)                      53.63


     BMI                                         17.6


     Subjective/Other Information                Screened for low BMI.


                                                 Pt and pt caregiver in room at


                                                 time of visit. Pt speaks only


                                                 [place] so caregiver acted


                                                 as . Stated that


                                                 PTA pt ate 2 small meals daily


                                                 d/t a bitter taste in his


                                                 mouth. Stated that pt eats


                                                 most of his meals here. Pt


                                                 agreed to trial of Glucerna.


                                                 Stated UBW was 97 lbs but


                                                 unsure of timeframe.


                                                 Noted moderate temporal and


                                                 slight clavicle wasting.


     Burn                                        Absent


     Trauma                                      Absent


     Minimum of two criteria                     Yes


     Energy Intake (non-severe)                  <75% Estimated Energy


                                                 Requirement >7 days


     Muscle Mass                                 Moderate Depletion (severe)


     #1


      Nutrition Diagnosis                        Malnutrition


      Etiology                                   decreased appetite, bitter


                                                 taste in mouth


      As Evidenced by Signs and Symptoms         moderate temporal wasting,


                                                 slight clavicle wasting, BMI


                                                 17.8, pt statement that PTA he


                                                 ate 2 small meals daily


     Is patient on ventilator?                   No


     Is Patient Ambulatory and/or Out of Bed     Yes


     REE-([facility]-ambulatory/OOB) [     1388.725


      [email or web address]]                               


     Kcal/Kg value to use for calculation        43


     Approximate Energy Requirements Using       1888


      kcal/Kg                                    


     Calculation Used for Recommendations        Kcal/kg


     Additional Notes                            Protein Needs: 53-66g (1.2-1.


                                                 5g/kg)


                                                 Fluid Needs: 1 ml/kcal


 Nutrition Intervention


     Change Diet Order:                          continue current


     Add Supplement/Snack (indicate name/kcal    Glucerna Vanilla 1 daily


      /protein )                                 


     Provides kCal:                              220


     Provides Protein (gm)                       10


     Goal #1                                     Meet at least 75% of calorie


                                                 and protein needs


     Goal #2                                     Wt gain/maintenance


     Anticipated Discharge Needs:                Cardiac/Consistent CHO diet


     Follow-Up By:                               11/13/19


     Additional Comments                         Follow for PO and ONS intakes

## 2019-11-12 NOTE — PROGRESS NOTE
Assessment and Plan





This is a 73 year old man with HTN who presents with hyponatremia.





# Hyponatremia: suspect hypovolemic, hypotonic hyponatremia due to diuretic use 

(although unclear what he is taking).  Sodium 122->129->131 as of this AM with 

IVF.  Possible that he has SIADH in setting of citalopram use as well; likely a 

combination of these factors.  


- would not overcorrect sodium to more than 136 after 48 hours (this evening)


- currently on NS at 75cc/hr for total of 1L; will recheck sodium this afternoon

but likely can hold on further IVF


- replete K to goal ~4meq/L


- hold diuretics (specifically thiazide diuretics) for now





# Calcium oxalate stones: seen on UA and stones noted on CT abd/pelvis; no acute

symptoms noted


- if asymptomatic, can follow up outpatient for litholink and further workup


- no role for citrate given alkaline urine





# Gap acidosis: improving; tox screen negative.  Likely with some dilutional 

acidosis from NS, will monitor  





# Malnutrition: low phos noted in addition to hypokalemia; agree with nutrition 

consult.  Replete P as able





Subjective


Date of service: 11/12/19


Principal diagnosis: hyponatremia


Interval history: 





No acute events noted overnight.  Patient in good spirits this AM, but continues

to feel weak with shaking of his feet.  Eating well. 





Objective





- Exam


Narrative Exam: 





General appearance: well-developed, well-nourished, appears stated age


EENT: PERRL, mucous membranes moist


Neck: Present: neck supple, trachea midline.


Respiratory: Clear to Ascultation


Heart: regular, normal heart rate, S1S2, no murmurs


Gastrointestinal: Present: normal.  Absent: tenderness, distended, masses, gu

arding


Integumentary: no rash, warm and dry


Neurologic: no focal deficit, alert and oriented x3, spontaneous shaking of feet

noted


Musculoskeletal: Absent: deformities, joint swelling


Psychiatric: mood/affect appropriate, cooperative





- Vital Signs


Vital signs: 


                               Vital Signs - 12hr











  11/11/19 11/12/19 11/12/19





  23:46 03:41 04:22


 


Temperature 98.2 F  97.4 F L


 


Pulse Rate 82 79 


 


Respiratory 16 18 





Rate   


 


Blood Pressure 150/79 150/86 


 


O2 Sat by Pulse 96 95 





Oximetry   














- Lab





                                 11/11/19 05:56





                                 11/12/19 04:20


                             Most recent lab results











Calcium  8.2 mg/dL (8.4-10.2)  L  11/12/19  04:20    


 


Phosphorus  2.30 mg/dL (2.5-4.5)  L  11/12/19  04:20    


 


Magnesium  2.00 mg/dL (1.7-2.3)   11/12/19  04:20    


 


Urine Sodium  132 mmol/L  11/11/19  Unknown














Medications & Allergies





- Medications


Allergies/Adverse Reactions: 


                                    Allergies





No Known Allergies Allergy (Verified 10/20/19 09:29)


   








Home Medications: 


                                Home Medications











 Medication  Instructions  Recorded  Confirmed  Last Taken  Type


 


Metoprolol [Lopressor TAB] 25 mg PO DAILY 12/21/18 11/10/19 Unknown History


 


Ranitidine HCl [Acid Reducer] 150 mg PO BID 12/21/18 11/10/19 Unknown History


 


metFORMIN XR [Glucophage XR] 500 mg PO QDDIAB #60 tablet 12/22/18 11/10/19 

Unknown Rx


 


Aspirin 325 mg PO QDAY #30 tablet 10/22/19 11/10/19 Unknown Rx


 


AtorvaSTATin [Lipitor] 40 mg PO QHS #30 tablet 10/22/19 11/10/19 Unknown Rx


 


Citalopram [celeXA] 20 mg PO QDAY 11/10/19 11/10/19 Unknown History


 


Tamsulosin [Flomax] 0.4 mg PO QDAY 11/10/19 11/10/19 Unknown History











Active Medications: 














Generic Name Dose Route Start Last Admin





  Trade Name Freq  PRN Reason Stop Dose Admin


 


Acetaminophen  650 mg  11/10/19 23:13  11/11/19 13:10





  Tylenol  PO   650 mg





  Q4H PRN   Administration





  Pain MILD(1-3)/Fever >100.5/HA  


 


Dextrose  50 ml  11/11/19 02:34 





  D50w (25gm) Syringe  IV  





  Q30MIN PRN  





  Hypoglycemia  





  Protocol  


 


Enoxaparin Sodium  30 mg  11/11/19 10:00  11/11/19 10:39





  Enoxaparin  SUB-Q   30 mg





  QDAY BONNIE   Administration


 


Hydralazine HCl  5 mg  11/11/19 01:10 





  Apresoline  IV  





  Q6H PRN  





  Hypertension  


 


Sodium Chloride  1,000 mls @ 75 mls/hr  11/11/19 22:00 





  Nacl 0.9% 1000 Ml  IV  





  AS DIRECT BONNIE  


 


Insulin Human Lispro  0 unit  11/11/19 07:30  11/12/19 08:40





  Humalog  SUB-Q   Not Given





  [facility]  





  Protocol  


 


Ondansetron HCl  4 mg  11/10/19 23:13 





  Zofran  IV  





  Q8H PRN  





  Nausea And Vomiting  


 


Sodium Chloride  10 ml  11/11/19 10:00  11/11/19 22:34





  Sodium Chloride Flush Syringe 10 Ml  IV   10 ml





  BID BONNIE   Administration


 


Sodium Chloride  10 ml  11/10/19 23:13 





  Sodium Chloride Flush Syringe 10 Ml  IV  





  PRN PRN  





  LINE FLUSH

## 2019-11-13 VITALS — DIASTOLIC BLOOD PRESSURE: 85 MMHG | SYSTOLIC BLOOD PRESSURE: 131 MMHG

## 2019-11-13 LAB
BUN SERPL-MCNC: 13 MG/DL (ref 9–20)
BUN/CREAT SERPL: 26 %
CALCIUM SERPL-MCNC: 8.8 MG/DL (ref 8.4–10.2)
HEMOLYSIS INDEX: 18

## 2019-11-13 RX ADMIN — Medication SCH ML: at 10:07

## 2019-11-13 RX ADMIN — ENOXAPARIN SODIUM SCH MG: 100 INJECTION SUBCUTANEOUS at 10:07

## 2019-11-13 RX ADMIN — INSULIN LISPRO SCH: 100 INJECTION, SOLUTION INTRAVENOUS; SUBCUTANEOUS at 08:03

## 2019-11-13 NOTE — DISCHARGE SUMMARY
Providers





- Providers


Date of Admission: 


11/10/19 22:52





Attending physician: 


HUANG WALLS MD





                                        





11/10/19 23:13


Consult to Physician [CONS] Routine 


   Comment: 


   Consulting Provider: CHELO DUNN


   Physician Instructions: 


   Reason For Exam: na 121











Primary care physician: 


PRIMARY CARE MD








Hospitalization


Reason for admission: hyponatremia


Condition: Stable


Hospital course: 


73-year-old male patient with significant past medical history of hypertension 

diabetes mellitus 


dyslipidemia depression PTSD was admitted through emergency room with 

uncontrolled blood pressures and severe hyponatremia


Evaluated by nephrology, medications optimized





--Severe hyponatremia; probably due to hypovolemic


IV normal saline Was given initally then held with Nephrology following, 

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 Aboved overcorrection. 


Thiazid duretics was discontinued and this discussed with the patient 


Patient to follow with Nephrology





--Hypokalemia -replaced per protocol and monitor levels





--Calcium oxalate stones: seen on UA and stones noted on CT abd/pelvis; 


asymptomatic, can follow up outpatient urology


No indication for citrate given alkaline urine 





--Metabolic acidosis: unclear etiology given normal renal function; improved





--Severe protein calorie Malnutrition; BMI 17.7


Nutrition supplements and supportive care





--Hypertension; moderate control


Continue current antihypertensives and when necessary medications





--Dyslipidemia; lipid-lowering medications low-cholesterol diet





--Type 2 diabetes mellitus; Accu-Chek sliding scale coverage ADA diet


Insulin as needed





--History of depression; no suicidal thoughts or ideation


Continue psych medications





--History of PTSD; supportive care and continue current management





--Insomnia- started on Restoril and patient showed improvement

















Disposition: DC-01 TO HOME OR SELFCARE


Time spent for discharge: 35 mins





Core Measure Documentation





- Palliative Care


Palliative Care/ Comfort Measures: Not Applicable





- Core Measures


Any of the following diagnoses?: none





Exam





- Physical Exam


Narrative exam: 


General appearance: Present: no acute distress, well-nourished





- EENT


Eyes: Present: PERRL, EOM intact





- Neck


Neck: Present: supple, normal ROM





- Respiratory


Respiratory effort: normal


Respiratory: bilateral: diminished, negative: rales, rhonchi, wheezing





- Cardiovascular


Rhythm: regular


Heart Sounds: Present: S1 & S2





- Extremities


Extremities: no ischemia, No edema





- Abdominal


General gastrointestinal: soft, non-tender, non-distended, normal bowel sounds





- Integumentary


Integumentary: Present: clear, warm





- Psychiatric


Psychiatric: appropriate mood/affect, cooperative





- Neurologic


Neurologic: CNII-XII intact, moves all extremities, bilateral lower ext tremor. 

improved today








- Constitutional


Vitals: 


                                        











Temp Pulse Resp BP Pulse Ox


 


 97.8 F   89   18   131/85   98 


 


 11/13/19 08:58  11/13/19 10:00  11/13/19 08:58  11/13/19 08:58  11/13/19 08:58














Plan


Activity: advance as tolerated, fall precautions


Diet: low fat, low salt, diabetic


Special Instructions: record daily BP diary, record blood sugar diary


Care Plan Goals: 


improved renal and sodium state


Plan of Treatment: 


discontinue thiazid diuretics. 


Health Concerns: 


hyponatremia


Follow up with: 


PRIMARY CARE,MD [Primary Care Provider] - 7 Days


RADHA LEE MD [Staff Physician] - 7 Days


Prescriptions: 


Temazepam [Restoril] 15 mg PO QHS PRN #30 capsule


 PRN Reason: Sleep

## 2019-11-13 NOTE — PROGRESS NOTE
Assessment and Plan





This is a 73 year old man with HTN who presents with hyponatremia.





# Hyponatremia: suspect hypovolemic, hypotonic hyponatremia due to diuretic use 

(although unclear what he is taking).  Sodium 122->129->132 as of this AM with 

IVF.  Possible that he has SIADH in setting of citalopram use as well; likely a 

combination of these factors.  


- will hold further IVF and encourage regular po intake


- hold diuretics (specifically thiazide diuretics) for now


- ok for discharge from renal perspective, will set up close follow up in our 

clinic





# Calcium oxalate stones: seen on UA and stones noted on CT abd/pelvis; no acute

symptoms noted


- if asymptomatic, can follow up outpatient for litholink and further workup


- no role for citrate given alkaline urine





# Gap acidosis: improving; tox screen negative.  Likely with some dilutional 

acidosis from NS, will monitor and repeat labs as outpatient  





# Malnutrition: low phos noted in addition to hypokalemia; agree with nutrition 

consult.  Replete P as able





Subjective


Date of service: 11/13/19


Principal diagnosis: hyponatremia


Interval history: 





No acute events noted overnight.  Patient in good spirits this AM. Continues to 

eat well. 





Objective





- Exam


Narrative Exam: 





General appearance: well-developed, well-nourished, appears stated age, sitting 

up in bed


EENT: PERRL, mucous membranes moist


Neck: Present: neck supple, trachea midline.


Respiratory: Clear to Ascultation


Heart: regular, normal heart rate, S1S2, no murmurs


Gastrointestinal: Present: normal.  Absent: tenderness, distended, masses, 

guarding


Integumentary: no rash, warm and dry


Neurologic: no focal deficit, alert and oriented x3


Musculoskeletal: Absent: deformities, joint swelling


Psychiatric: mood/affect appropriate, cooperative





- Vital Signs


Vital signs: 


                               Vital Signs - 12hr











  11/13/19 11/13/19 11/13/19





  03:44 08:58 10:00


 


Temperature 98.3 F 97.8 F 


 


Pulse Rate 77 76 89


 


Respiratory 16 18 





Rate   


 


Blood Pressure 146/89 131/85 


 


O2 Sat by Pulse 97 98 





Oximetry   














- Lab





                                 11/11/19 05:56





                                 11/13/19 06:42


                             Most recent lab results











Calcium  8.8 mg/dL (8.4-10.2)   11/13/19  06:42    


 


Phosphorus  2.30 mg/dL (2.5-4.5)  L  11/12/19  04:20    


 


Magnesium  2.00 mg/dL (1.7-2.3)   11/12/19  04:20    


 


Urine Sodium  132 mmol/L  11/11/19  Unknown














Medications & Allergies





- Medications


Allergies/Adverse Reactions: 


                                    Allergies





No Known Allergies Allergy (Verified 10/20/19 09:29)


   








Home Medications: 


                                Home Medications











 Medication  Instructions  Recorded  Confirmed  Last Taken  Type


 


Metoprolol [Lopressor TAB] 25 mg PO DAILY 12/21/18 11/10/19 Unknown History


 


Ranitidine HCl [Acid Reducer] 150 mg PO BID 12/21/18 11/10/19 Unknown History


 


metFORMIN XR [Glucophage XR] 500 mg PO QDDIAB #60 tablet 12/22/18 11/10/19 Unkno

wn Rx


 


Aspirin 325 mg PO QDAY #30 tablet 10/22/19 11/10/19 Unknown Rx


 


AtorvaSTATin [Lipitor] 40 mg PO QHS #30 tablet 10/22/19 11/10/19 Unknown Rx


 


Citalopram [celeXA] 20 mg PO QDAY 11/10/19 11/10/19 Unknown History


 


Tamsulosin [Flomax] 0.4 mg PO QDAY 11/10/19 11/10/19 Unknown History











Active Medications: 














Generic Name Dose Route Start Last Admin





  Trade Name Freq  PRN Reason Stop Dose Admin


 


Acetaminophen  650 mg  11/10/19 23:13  11/11/19 13:10





  Tylenol  PO   650 mg





  Q4H PRN   Administration





  Pain MILD(1-3)/Fever >100.5/HA  


 


Dextrose  50 ml  11/11/19 02:34 





  D50w (25gm) Syringe  IV  





  Q30MIN PRN  





  Hypoglycemia  





  Protocol  


 


Enoxaparin Sodium  30 mg  11/11/19 10:00  11/13/19 10:07





  Enoxaparin  SUB-Q   30 mg





  QDAY BONNIE   Administration


 


Hydralazine HCl  5 mg  11/11/19 01:10 





  Apresoline  IV  





  Q6H PRN  





  Hypertension  


 


Insulin Human Lispro  0 unit  11/11/19 07:30  11/13/19 08:03





  Humalog  SUB-Q   Not Given





  ACHS BONNIE  





  Protocol  


 


Ondansetron HCl  4 mg  11/10/19 23:13 





  Zofran  IV  





  Q8H PRN  





  Nausea And Vomiting  


 


Sodium Chloride  10 ml  11/11/19 10:00  11/13/19 10:07





  Sodium Chloride Flush Syringe 10 Ml  IV   10 ml





  BID BONNIE   Administration


 


Sodium Chloride  10 ml  11/10/19 23:13 





  Sodium Chloride Flush Syringe 10 Ml  IV  





  PRN PRN  





  LINE FLUSH  


 


Temazepam  15 mg  11/12/19 18:14  11/12/19 21:08





  Restoril  PO   15 mg





  QHS PRN   Administration





  Sleep

## 2020-03-06 ENCOUNTER — HOSPITAL ENCOUNTER (EMERGENCY)
Dept: HOSPITAL 5 - ED | Age: 75
Discharge: HOME | End: 2020-03-06
Payer: MEDICARE

## 2020-03-06 VITALS — SYSTOLIC BLOOD PRESSURE: 133 MMHG | DIASTOLIC BLOOD PRESSURE: 85 MMHG

## 2020-03-06 DIAGNOSIS — G47.09: ICD-10-CM

## 2020-03-06 DIAGNOSIS — M19.90: ICD-10-CM

## 2020-03-06 DIAGNOSIS — E78.00: ICD-10-CM

## 2020-03-06 DIAGNOSIS — Z79.899: ICD-10-CM

## 2020-03-06 DIAGNOSIS — E11.9: ICD-10-CM

## 2020-03-06 DIAGNOSIS — I10: ICD-10-CM

## 2020-03-06 DIAGNOSIS — B02.30: Primary | ICD-10-CM

## 2020-03-06 PROCEDURE — 96375 TX/PRO/DX INJ NEW DRUG ADDON: CPT

## 2020-03-06 PROCEDURE — 99282 EMERGENCY DEPT VISIT SF MDM: CPT

## 2020-03-06 PROCEDURE — 96365 THER/PROPH/DIAG IV INF INIT: CPT

## 2020-03-06 NOTE — EMERGENCY DEPARTMENT REPORT
ED Eye Problem HPI





- General


Chief complaint: Eye Problems


Stated complaint: RIGHT EYE PAIN


Time Seen by Provider: 03/06/20 10:47


Source: patient


Mode of arrival: Ambulatory


Limitations: Language Barrier





- History of Present Illness


Initial comments: 


This is a 74-year-old male who presents the ED complaining of right eye swelling

and redness for the past 3 days.  Patient states initially about a week ago he 

experienced some burning tingling sensation across his head to his face.  

Patient also states that shortly he did notice rash to the right upper forehead 

and eye area.  Patient states that initially scratched it causing blister.  

Patient states that he did go to his ophthalmologist 3 days ago and was 

prescribed some acyclovir and erythromycin ointment cream.  Patient states he 

has been using that as instructed but today he has some swelling and redness to 

the right eye so he presents here.  Patient denies any vision loss, eye pain, 

vision pain, blurry vision to the eye





- Related Data


                                Home Medications











 Medication  Instructions  Recorded  Confirmed  Last Taken


 


Metoprolol [Lopressor TAB] 25 mg PO DAILY 12/21/18 11/10/19 Unknown


 


raNITIdine HCl [Acid Reducer] 150 mg PO BID 12/21/18 11/10/19 Unknown


 


Citalopram [Celexa] 20 mg PO QDAY 11/10/19 11/10/19 Unknown


 


Tamsulosin [Flomax] 0.4 mg PO QDAY 11/10/19 11/10/19 Unknown








                                  Previous Rx's











 Medication  Instructions  Recorded  Last Taken  Type


 


metFORMIN XR [Glucophage XR] 500 mg PO QDDIAB #60 tablet 12/22/18 Unknown Rx


 


Aspirin 325 mg PO QDAY #30 tablet 10/22/19 Unknown Rx


 


AtorvaSTATin [Lipitor] 40 mg PO QHS #30 tablet 10/22/19 Unknown Rx


 


Temazepam [Restoril] 15 mg PO QHS PRN #30 capsule 11/13/19 Unknown Rx











                                    Allergies











Allergy/AdvReac Type Severity Reaction Status Date / Time


 


No Known Allergies Allergy   Verified 10/20/19 09:29














ED Review of Systems


ROS: 


Stated complaint: RIGHT EYE PAIN


Other details as noted in HPI





Comment: All other systems reviewed and negative





ED Past Medical Hx





- Past Medical History


Previous Medical History?: Yes


Hx Hypertension: Yes


Hx Heart Attack/AMI: No


Hx Congestive Heart Failure: No


Hx Diabetes: Yes


Hx Deep Vein Thrombosis: No


Hx Liver Disease: No


Hx Arthritis: Yes


Additional medical history: high cholesterol, sinusitis,insomnia





- Surgical History


Hx Coronary Stent: No


Hx Pacemaker: No


Hx Internal Defibrillator: No





- Social History


Smoking Status: Never Smoker


Substance Use Type: None





- Medications


Home Medications: 


                                Home Medications











 Medication  Instructions  Recorded  Confirmed  Last Taken  Type


 


Metoprolol [Lopressor TAB] 25 mg PO DAILY 12/21/18 11/10/19 Unknown History


 


raNITIdine HCl [Acid Reducer] 150 mg PO BID 12/21/18 11/10/19 Unknown History


 


metFORMIN XR [Glucophage XR] 500 mg PO QDDIAB #60 tablet 12/22/18 11/10/19 

Unknown Rx


 


Aspirin 325 mg PO QDAY #30 tablet 10/22/19 11/10/19 Unknown Rx


 


AtorvaSTATin [Lipitor] 40 mg PO QHS #30 tablet 10/22/19 11/10/19 Unknown Rx


 


Citalopram [Celexa] 20 mg PO QDAY 11/10/19 11/10/19 Unknown History


 


Tamsulosin [Flomax] 0.4 mg PO QDAY 11/10/19 11/10/19 Unknown History


 


Temazepam [Restoril] 15 mg PO QHS PRN #30 capsule 11/13/19  Unknown Rx














ED Physical Exam





- General


Limitations: Language Barrier


General appearance: alert, in no apparent distress





- Head


Head exam: Present: atraumatic, normocephalic





- Eye


Eye exam: Present: normal appearance, PERRL, periorbital swelling.  Absent: 

periorbital tenderness


Pupils: Present: normal accommodation





- Expanded Eye Exam


  ** Expanded


Eyelids: Erythema: Right, Swelling: Right (Noted surrounding the right eyebrow 

check)


Pupils: Regular, Round: Bilateral, Reactive: Bilateral


Sclera/Conjunctival: Normal Inspection: Bilateral





- ENT


ENT exam: Present: mucous membranes moist





- Neck


Neck exam: Present: normal inspection





- Respiratory


Respiratory exam: Present: normal lung sounds bilaterally.  Absent: respiratory 

distress





- Cardiovascular


Cardiovascular Exam: Present: regular rate, normal rhythm.  Absent: systolic 

murmur, diastolic murmur, rubs, gallop





- GI/Abdominal


GI/Abdominal exam: Present: soft, normal bowel sounds





- Rectal


Rectal exam: Present: deferred





- Extremities Exam


Extremities exam: Present: normal inspection





- Back Exam


Back exam: Present: normal inspection





- Neurological Exam


Neurological exam: Present: alert, oriented X3





- Psychiatric


Psychiatric exam: Present: normal affect, normal mood





- Skin


Skin exam: Present: warm, dry, intact, normal color.  Absent: rash





ED Course





                                   Vital Signs











  03/06/20





  09:46


 


Temperature 99.1 F


 


Pulse Rate 121 H


 


Respiratory 20





Rate 


 


Blood Pressure 133/85


 


O2 Sat by Pulse 97





Oximetry 














ED Medical Decision Making





- Radiology Data


Radiology results: report reviewed, image reviewed





- Medical Decision Making





This 74-year-old male who presents with ocular herpes zoster, shingles


Patient is already taking acyclovir 800 mg 3 times a day along with erythromycin

 ointment.


Due to the fact that it appears to be some cellulitis secondary to secondary 

infection.


Patient treated here with IV clindamycin as well as IV Decadron to reduce 

swelling.


Discussed with patient to continue taking acyclovir and erythromycin ointment.


Discussed with patient to follow-up with his or pulmonologist.


Vital signs are normal vision is intact patient is in no acute distress patient 

denied any eye pain


Instructions given to patient and his son.


Critical care attestation.: 


If time is entered above; I have spent that time in minutes in the direct care 

of this critically ill patient, excluding procedure time.








ED Disposition


Clinical Impression: 


 Ocular herpes zoster, Shingles outbreak





Disposition: DC-01 TO HOME OR SELFCARE


Is pt being admited?: No


Does the pt Need Aspirin: No


Condition: Stable


Instructions:  Herpes Zoster (ED), Orbital Cellulitis (ED)


Additional Instructions: 


Make sure to follow up with the primary care physician as discussed.


Take all your medications as you've been prescribed.


If you have any worsening symptoms or develop new symptoms please return to ED 

immediately.


Referrals: 


JOAO MOLINA MD [Primary Care Provider] - 3-5 Days


JONATHON MICHAEL MD [Staff Physician] - 3-5 Days


[employer] [facility] [employer] [Provider Group] - 3-5 Days


[facility] [Provider Group] - 3-5 Days


Forms:  Accompanied Note, Work/School Release Form(ED)


Time of Disposition: 12:25

## 2025-07-01 ENCOUNTER — WEB ENCOUNTER (OUTPATIENT)
Dept: URBAN - METROPOLITAN AREA CLINIC 6 | Facility: CLINIC | Age: 80
End: 2025-07-01